# Patient Record
Sex: FEMALE | Race: OTHER | HISPANIC OR LATINO
[De-identification: names, ages, dates, MRNs, and addresses within clinical notes are randomized per-mention and may not be internally consistent; named-entity substitution may affect disease eponyms.]

---

## 2020-01-02 ENCOUNTER — ASOB RESULT (OUTPATIENT)
Age: 30
End: 2020-01-02

## 2020-01-02 ENCOUNTER — APPOINTMENT (OUTPATIENT)
Dept: ANTEPARTUM | Facility: CLINIC | Age: 30
End: 2020-01-02
Payer: MEDICAID

## 2020-01-02 PROBLEM — Z00.00 ENCOUNTER FOR PREVENTIVE HEALTH EXAMINATION: Status: ACTIVE | Noted: 2020-01-02

## 2020-01-02 PROCEDURE — 76805 OB US >/= 14 WKS SNGL FETUS: CPT

## 2020-01-02 PROCEDURE — 76817 TRANSVAGINAL US OBSTETRIC: CPT

## 2020-01-17 ENCOUNTER — ASOB RESULT (OUTPATIENT)
Age: 30
End: 2020-01-17

## 2020-01-17 ENCOUNTER — APPOINTMENT (OUTPATIENT)
Dept: ANTEPARTUM | Facility: CLINIC | Age: 30
End: 2020-01-17
Payer: MEDICAID

## 2020-01-17 PROCEDURE — 76816 OB US FOLLOW-UP PER FETUS: CPT

## 2022-06-02 ENCOUNTER — EMERGENCY (EMERGENCY)
Facility: HOSPITAL | Age: 32
LOS: 1 days | Discharge: HOME | End: 2022-06-02
Attending: EMERGENCY MEDICINE
Payer: MEDICAID

## 2022-06-02 VITALS
WEIGHT: 223.11 LBS | OXYGEN SATURATION: 98 % | RESPIRATION RATE: 16 BRPM | TEMPERATURE: 98 F | HEART RATE: 86 BPM | DIASTOLIC BLOOD PRESSURE: 82 MMHG | SYSTOLIC BLOOD PRESSURE: 128 MMHG

## 2022-06-02 LAB
ALBUMIN SERPL ELPH-MCNC: 4.1 G/DL — SIGNIFICANT CHANGE UP (ref 3.3–5.2)
ALP SERPL-CCNC: 96 U/L — SIGNIFICANT CHANGE UP (ref 40–120)
ALT FLD-CCNC: 14 U/L — SIGNIFICANT CHANGE UP
ANION GAP SERPL CALC-SCNC: 16 MMOL/L — SIGNIFICANT CHANGE UP (ref 5–17)
APPEARANCE UR: CLEAR — SIGNIFICANT CHANGE UP
AST SERPL-CCNC: 18 U/L — SIGNIFICANT CHANGE UP
BACTERIA # UR AUTO: ABNORMAL
BASOPHILS # BLD AUTO: 0.04 K/UL — SIGNIFICANT CHANGE UP (ref 0–0.2)
BASOPHILS NFR BLD AUTO: 0.4 % — SIGNIFICANT CHANGE UP (ref 0–2)
BILIRUB SERPL-MCNC: 0.3 MG/DL — LOW (ref 0.4–2)
BILIRUB UR-MCNC: NEGATIVE — SIGNIFICANT CHANGE UP
BLD GP AB SCN SERPL QL: SIGNIFICANT CHANGE UP
BUN SERPL-MCNC: 8.8 MG/DL — SIGNIFICANT CHANGE UP (ref 8–20)
CALCIUM SERPL-MCNC: 8.9 MG/DL — SIGNIFICANT CHANGE UP (ref 8.6–10.2)
CHLORIDE SERPL-SCNC: 103 MMOL/L — SIGNIFICANT CHANGE UP (ref 98–107)
CO2 SERPL-SCNC: 19 MMOL/L — LOW (ref 22–29)
COD CRY URNS QL: ABNORMAL
COLOR SPEC: YELLOW — SIGNIFICANT CHANGE UP
CREAT SERPL-MCNC: 0.34 MG/DL — LOW (ref 0.5–1.3)
DIFF PNL FLD: ABNORMAL
EGFR: 142 ML/MIN/1.73M2 — SIGNIFICANT CHANGE UP
EOSINOPHIL # BLD AUTO: 0.12 K/UL — SIGNIFICANT CHANGE UP (ref 0–0.5)
EOSINOPHIL NFR BLD AUTO: 1.2 % — SIGNIFICANT CHANGE UP (ref 0–6)
EPI CELLS # UR: ABNORMAL
GLUCOSE SERPL-MCNC: 87 MG/DL — SIGNIFICANT CHANGE UP (ref 70–99)
GLUCOSE UR QL: NEGATIVE MG/DL — SIGNIFICANT CHANGE UP
HCG SERPL-ACNC: HIGH MIU/ML
HCT VFR BLD CALC: 40.9 % — SIGNIFICANT CHANGE UP (ref 34.5–45)
HGB BLD-MCNC: 13.5 G/DL — SIGNIFICANT CHANGE UP (ref 11.5–15.5)
IMM GRANULOCYTES NFR BLD AUTO: 0.3 % — SIGNIFICANT CHANGE UP (ref 0–1.5)
KETONES UR-MCNC: ABNORMAL
LEUKOCYTE ESTERASE UR-ACNC: ABNORMAL
LYMPHOCYTES # BLD AUTO: 2.87 K/UL — SIGNIFICANT CHANGE UP (ref 1–3.3)
LYMPHOCYTES # BLD AUTO: 29.3 % — SIGNIFICANT CHANGE UP (ref 13–44)
MCHC RBC-ENTMCNC: 27 PG — SIGNIFICANT CHANGE UP (ref 27–34)
MCHC RBC-ENTMCNC: 33 GM/DL — SIGNIFICANT CHANGE UP (ref 32–36)
MCV RBC AUTO: 81.8 FL — SIGNIFICANT CHANGE UP (ref 80–100)
MONOCYTES # BLD AUTO: 0.47 K/UL — SIGNIFICANT CHANGE UP (ref 0–0.9)
MONOCYTES NFR BLD AUTO: 4.8 % — SIGNIFICANT CHANGE UP (ref 2–14)
NEUTROPHILS # BLD AUTO: 6.27 K/UL — SIGNIFICANT CHANGE UP (ref 1.8–7.4)
NEUTROPHILS NFR BLD AUTO: 64 % — SIGNIFICANT CHANGE UP (ref 43–77)
NITRITE UR-MCNC: NEGATIVE — SIGNIFICANT CHANGE UP
PH UR: 6 — SIGNIFICANT CHANGE UP (ref 5–8)
PLATELET # BLD AUTO: 277 K/UL — SIGNIFICANT CHANGE UP (ref 150–400)
POTASSIUM SERPL-MCNC: 4.1 MMOL/L — SIGNIFICANT CHANGE UP (ref 3.5–5.3)
POTASSIUM SERPL-SCNC: 4.1 MMOL/L — SIGNIFICANT CHANGE UP (ref 3.5–5.3)
PROT SERPL-MCNC: 7.6 G/DL — SIGNIFICANT CHANGE UP (ref 6.6–8.7)
PROT UR-MCNC: NEGATIVE — SIGNIFICANT CHANGE UP
RBC # BLD: 5 M/UL — SIGNIFICANT CHANGE UP (ref 3.8–5.2)
RBC # FLD: 13 % — SIGNIFICANT CHANGE UP (ref 10.3–14.5)
RBC CASTS # UR COMP ASSIST: ABNORMAL /HPF (ref 0–4)
SODIUM SERPL-SCNC: 138 MMOL/L — SIGNIFICANT CHANGE UP (ref 135–145)
SP GR SPEC: 1.02 — SIGNIFICANT CHANGE UP (ref 1.01–1.02)
UROBILINOGEN FLD QL: 1 MG/DL
WBC # BLD: 9.8 K/UL — SIGNIFICANT CHANGE UP (ref 3.8–10.5)
WBC # FLD AUTO: 9.8 K/UL — SIGNIFICANT CHANGE UP (ref 3.8–10.5)
WBC UR QL: ABNORMAL /HPF (ref 0–5)

## 2022-06-02 PROCEDURE — 99284 EMERGENCY DEPT VISIT MOD MDM: CPT

## 2022-06-02 PROCEDURE — 86850 RBC ANTIBODY SCREEN: CPT

## 2022-06-02 PROCEDURE — 76817 TRANSVAGINAL US OBSTETRIC: CPT | Mod: 26

## 2022-06-02 PROCEDURE — 84702 CHORIONIC GONADOTROPIN TEST: CPT

## 2022-06-02 PROCEDURE — 99284 EMERGENCY DEPT VISIT MOD MDM: CPT | Mod: 25

## 2022-06-02 PROCEDURE — 36415 COLL VENOUS BLD VENIPUNCTURE: CPT

## 2022-06-02 PROCEDURE — 76817 TRANSVAGINAL US OBSTETRIC: CPT

## 2022-06-02 PROCEDURE — 76801 OB US < 14 WKS SINGLE FETUS: CPT | Mod: 26

## 2022-06-02 PROCEDURE — 80053 COMPREHEN METABOLIC PANEL: CPT

## 2022-06-02 PROCEDURE — 85025 COMPLETE CBC W/AUTO DIFF WBC: CPT

## 2022-06-02 PROCEDURE — 81001 URINALYSIS AUTO W/SCOPE: CPT

## 2022-06-02 PROCEDURE — 76801 OB US < 14 WKS SINGLE FETUS: CPT

## 2022-06-02 PROCEDURE — 86901 BLOOD TYPING SEROLOGIC RH(D): CPT

## 2022-06-02 PROCEDURE — 86900 BLOOD TYPING SEROLOGIC ABO: CPT

## 2022-06-02 RX ORDER — CEPHALEXIN 500 MG
1 CAPSULE ORAL
Qty: 14 | Refills: 0
Start: 2022-06-02 | End: 2022-06-08

## 2022-06-02 RX ORDER — CEPHALEXIN 500 MG
500 CAPSULE ORAL ONCE
Refills: 0 | Status: COMPLETED | OUTPATIENT
Start: 2022-06-02 | End: 2022-06-02

## 2022-06-02 RX ADMIN — Medication 500 MILLIGRAM(S): at 22:09

## 2022-06-02 NOTE — ED STATDOCS - NSFOLLOWUPINSTRUCTIONS_ED_ALL_ED_FT
[place] los antibióticos según lo prescrito para la infección del tracto urinario.  [place] Tylenol según sea necesario para el dolor.  [place] un seguimiento con palmer ginecólogo en 2 días para exámenes de laboratorio y ultrasonido repetidos  Reg[place] a la lucio de emergencias por empeoramiento del dolor abdominal, empeoramiento del sangrado, fiebre u otra inquietud.    Amenaza de aborto espontáneo    Marleny amenaza de aborto es el término para el sangrado vaginal norberto las primeras 20 semanas de embarazo, nikolai el embarazo no ha terminado. Si tiene sangrado vaginal norberto yuliet tiempo, palmer proveedor de atención médica le hará pruebas para verificar si todavía está embarazada. Si las pruebas muestran que todavía está embarazada y el bebé en desarrollo (feto) dentro de palmer matriz (útero) todavía está creciendo, palmer condición se considera marleny amenaza de aborto espontáneo. Marleny amenaza de aborto espontáneo no significa que palmer embarazo terminará, nikolai aumenta el riesgo de perderlo. Es importante tener un seguimiento cercano con palmer obstetra.    BUSQUE ATENCIÓN MÉDICA DE INMEDIATO SI TIENE ALGUNO DE LOS SIGUIENTES SÍNTOMAS: sangrado vaginal intenso, calambres abdominales o lumbares intensos, fiebre/escalofríos o aturdimiento/mareo.

## 2022-06-02 NOTE — ED STATDOCS - OBJECTIVE STATEMENT
30 y/o female  @6 week pregnant with no PMHx, c/o pregnancy problem. Pt reports vaginal bleeding while using the restroom today and back pain. Pt had GYN appointment on , had ultrasound which showed normal gestational sac. GYN Dr. Alonso.  Deshawn

## 2022-06-02 NOTE — ED STATDOCS - PROGRESS NOTE DETAILS
Bibi Benítez PA :  PT evaluated by intake physician. HPI/PE/ROS as noted above. Will follow up plan per intake physician  HCG 83748 US 6 wks 2 days IUP. +UTI.  Will start on abx for UTI, advised pt this could be threatened miscarriage, recommended repeat labs/us in 2 days with her OBGYN  Pt agreeable with plan.

## 2022-06-02 NOTE — ED STATDOCS - PATIENT PORTAL LINK FT
You can access the FollowMyHealth Patient Portal offered by [facility] by registering at the following website: [email or web address]. By joining [facility]’s FollowMyHealth portal, you will also be able to view your health information using other applications (apps) compatible with our system.

## 2022-06-02 NOTE — ED STATDOCS - ATTENDING APP SHARED VISIT CONTRIBUTION OF CARE
I, Maurice Levi, performed the initial face to face bedside interview with this patient regarding history of present illness, review of symptoms and relevant past medical, social and family history.  I completed an independent physical examination.  I was the initial provider who evaluated this patient. I have signed out the follow up of any pending tests (i.e. labs, radiological studies) to the ACP.  I have communicated the patient’s plan of care and disposition with the ACP.

## 2022-06-03 ENCOUNTER — NON-APPOINTMENT (OUTPATIENT)
Age: 32
End: 2022-06-03

## 2022-06-04 ENCOUNTER — APPOINTMENT (OUTPATIENT)
Dept: OBGYN | Facility: CLINIC | Age: 32
End: 2022-06-04
Payer: MEDICAID

## 2022-06-04 VITALS
HEIGHT: 65 IN | BODY MASS INDEX: 36.82 KG/M2 | WEIGHT: 221 LBS | DIASTOLIC BLOOD PRESSURE: 82 MMHG | SYSTOLIC BLOOD PRESSURE: 128 MMHG

## 2022-06-04 DIAGNOSIS — Z78.9 OTHER SPECIFIED HEALTH STATUS: ICD-10-CM

## 2022-06-04 DIAGNOSIS — O20.0 THREATENED ABORTION: ICD-10-CM

## 2022-06-04 DIAGNOSIS — Z87.09 PERSONAL HISTORY OF OTHER DISEASES OF THE RESPIRATORY SYSTEM: ICD-10-CM

## 2022-06-04 LAB
HCG UR QL: POSITIVE
QUALITY CONTROL: YES

## 2022-06-04 PROCEDURE — 81025 URINE PREGNANCY TEST: CPT

## 2022-06-04 PROCEDURE — 99204 OFFICE O/P NEW MOD 45 MIN: CPT

## 2022-06-04 NOTE — HISTORY OF PRESENT ILLNESS
[N] : Patient does not use contraception [Y] : Positive pregnancy history [Menarche Age: ____] : age at menarche was [unfilled] [Currently Active] : currently active [LMPDate] : 4/8/22 [PGHxTotal] : 4 [[facility]FullTerm] : 2 [PGxPremature] : 1 [[facility] Maryvaleiving] : 3 [FreeTextEntry1] : 4/8/22

## 2022-06-04 NOTE — PLAN
[FreeTextEntry1] : We discussed her sonogram from the ER yesterday noting a fetal heart of 113 we discussed the differentials for low FH including that it may just be too early and I would advise a repeat sonogram in 1 week to evaluate for growth and FH.  We also reviewed that at times this can be a red flag for miscarriage and bleeding precautions and parameters reviewed with the patient.  \par \par Patient verbalized good understanding via  of all instructions\par \par fu 1 week or sooner as needed

## 2022-06-10 ENCOUNTER — ASOB RESULT (OUTPATIENT)
Age: 32
End: 2022-06-10

## 2022-06-10 ENCOUNTER — APPOINTMENT (OUTPATIENT)
Dept: ANTEPARTUM | Facility: CLINIC | Age: 32
End: 2022-06-10
Payer: MEDICAID

## 2022-06-10 ENCOUNTER — LABORATORY RESULT (OUTPATIENT)
Age: 32
End: 2022-06-10

## 2022-06-10 ENCOUNTER — APPOINTMENT (OUTPATIENT)
Dept: OBGYN | Facility: CLINIC | Age: 32
End: 2022-06-10
Payer: MEDICAID

## 2022-06-10 VITALS
SYSTOLIC BLOOD PRESSURE: 116 MMHG | DIASTOLIC BLOOD PRESSURE: 70 MMHG | WEIGHT: 222 LBS | HEIGHT: 65 IN | BODY MASS INDEX: 36.99 KG/M2

## 2022-06-10 DIAGNOSIS — Z87.51 PERSONAL HISTORY OF PRE-TERM LABOR: ICD-10-CM

## 2022-06-10 DIAGNOSIS — Z34.81 ENCOUNTER FOR SUPERVISION OF OTHER NORMAL PREGNANCY, FIRST TRIMESTER: ICD-10-CM

## 2022-06-10 LAB
BILIRUB UR QL STRIP: NORMAL
CLARITY UR: CLEAR
COLLECTION METHOD: NORMAL
GLUCOSE UR-MCNC: NORMAL
HCG UR QL: 0.2 EU/DL
HGB UR QL STRIP.AUTO: ABNORMAL
KETONES UR-MCNC: NORMAL
LEUKOCYTE ESTERASE UR QL STRIP: ABNORMAL
NITRITE UR QL STRIP: NORMAL
PH UR STRIP: 7.5
PROT UR STRIP-MCNC: NORMAL
SP GR UR STRIP: 1.02

## 2022-06-10 PROCEDURE — 76817 TRANSVAGINAL US OBSTETRIC: CPT

## 2022-06-10 PROCEDURE — 0500F INITIAL PRENATAL CARE VISIT: CPT

## 2022-06-10 PROCEDURE — 36415 COLL VENOUS BLD VENIPUNCTURE: CPT

## 2022-06-13 ENCOUNTER — NON-APPOINTMENT (OUTPATIENT)
Age: 32
End: 2022-06-13

## 2022-06-30 LAB
ABO + RH PNL BLD: NORMAL
APPEARANCE: CLEAR
AR GENE MUT ANL BLD/T: NORMAL
BASOPHILS # BLD AUTO: 0.03 K/UL
BASOPHILS NFR BLD AUTO: 0.4 %
BILIRUBIN URINE: NEGATIVE
BLD GP AB SCN SERPL QL: NORMAL
BLOOD URINE: ABNORMAL
C TRACH RRNA SPEC QL NAA+PROBE: NOT DETECTED
CFTR MUT TESTED BLD/T: NEGATIVE
CMV IGG SERPL QL: 5.4 U/ML
CMV IGG SERPL-IMP: POSITIVE
CMV IGM SERPL QL: <8 AU/ML
CMV IGM SERPL QL: NEGATIVE
COLOR: NORMAL
EOSINOPHIL # BLD AUTO: 0.13 K/UL
EOSINOPHIL NFR BLD AUTO: 1.5 %
ESTIMATED AVERAGE GLUCOSE: 103 MG/DL
FMR1 GENE MUT ANL BLD/T: NORMAL
GLUCOSE QUALITATIVE U: NEGATIVE
HBA1C MFR BLD HPLC: 5.2 %
HBV SURFACE AG SER QL: NONREACTIVE
HCT VFR BLD CALC: 40.7 %
HGB A MFR BLD: 97.5 %
HGB A2 MFR BLD: 2.5 %
HGB BLD-MCNC: 13.3 G/DL
HGB FRACT BLD-IMP: NORMAL
HIV1+2 AB SPEC QL IA.RAPID: NONREACTIVE
IMM GRANULOCYTES NFR BLD AUTO: 0.4 %
KETONES URINE: NEGATIVE
LEUKOCYTE ESTERASE URINE: ABNORMAL
LYMPHOCYTES # BLD AUTO: 2.61 K/UL
LYMPHOCYTES NFR BLD AUTO: 31 %
MAN DIFF?: NORMAL
MCHC RBC-ENTMCNC: 26.7 PG
MCHC RBC-ENTMCNC: 32.7 GM/DL
MCV RBC AUTO: 81.6 FL
MEV IGG FLD QL IA: 56.5 AU/ML
MEV IGG+IGM SER-IMP: POSITIVE
MONOCYTES # BLD AUTO: 0.37 K/UL
MONOCYTES NFR BLD AUTO: 4.4 %
N GONORRHOEA RRNA SPEC QL NAA+PROBE: NOT DETECTED
NEUTROPHILS # BLD AUTO: 5.26 K/UL
NEUTROPHILS NFR BLD AUTO: 62.3 %
NITRITE URINE: NEGATIVE
PH URINE: 7.5
PLATELET # BLD AUTO: 298 K/UL
PROTEIN URINE: NEGATIVE
RBC # BLD: 4.99 M/UL
RBC # FLD: 13.3 %
RUBV IGG FLD-ACNC: 4.8 INDEX
RUBV IGG SER-IMP: POSITIVE
RUBV IGM FLD-ACNC: <20 AU/ML
SOURCE AMPLIFICATION: NORMAL
SPECIFIC GRAVITY URINE: 1.01
T PALLIDUM AB SER QL IA: NEGATIVE
TSH SERPL-ACNC: 2.23 UIU/ML
UROBILINOGEN URINE: NORMAL
WBC # FLD AUTO: 8.43 K/UL

## 2022-07-14 ENCOUNTER — APPOINTMENT (OUTPATIENT)
Dept: OBGYN | Facility: CLINIC | Age: 32
End: 2022-07-14

## 2022-07-14 ENCOUNTER — APPOINTMENT (OUTPATIENT)
Dept: ANTEPARTUM | Facility: CLINIC | Age: 32
End: 2022-07-14

## 2022-07-14 ENCOUNTER — ASOB RESULT (OUTPATIENT)
Age: 32
End: 2022-07-14

## 2022-07-14 VITALS
DIASTOLIC BLOOD PRESSURE: 64 MMHG | BODY MASS INDEX: 36.99 KG/M2 | SYSTOLIC BLOOD PRESSURE: 110 MMHG | HEIGHT: 65 IN | WEIGHT: 222 LBS

## 2022-07-14 DIAGNOSIS — Z34.91 ENCOUNTER FOR SUPERVISION OF NORMAL PREGNANCY, UNSPECIFIED, FIRST TRIMESTER: ICD-10-CM

## 2022-07-14 PROCEDURE — 36415 COLL VENOUS BLD VENIPUNCTURE: CPT

## 2022-07-14 PROCEDURE — 76813 OB US NUCHAL MEAS 1 GEST: CPT

## 2022-07-14 PROCEDURE — 0502F SUBSEQUENT PRENATAL CARE: CPT

## 2022-07-19 LAB
1ST TRIMESTER DATA: NORMAL
ADDENDUM DOC: NORMAL
AFP PNL SERPL: NORMAL
AFP SERPL-ACNC: NORMAL
CLINICAL BIOCHEMIST REVIEW: NORMAL
FREE BETA HCG 1ST TRIMESTER: NORMAL
Lab: NORMAL
NASAL BONE: PRESENT
NOTES NTD: NORMAL
NT: NORMAL
PAPP-A SERPL-ACNC: NORMAL
TRISOMY 18/3: NORMAL

## 2022-08-23 ENCOUNTER — APPOINTMENT (OUTPATIENT)
Dept: OBGYN | Facility: CLINIC | Age: 32
End: 2022-08-23

## 2022-08-23 VITALS
HEIGHT: 65 IN | BODY MASS INDEX: 35.65 KG/M2 | SYSTOLIC BLOOD PRESSURE: 110 MMHG | WEIGHT: 214 LBS | DIASTOLIC BLOOD PRESSURE: 60 MMHG

## 2022-08-23 DIAGNOSIS — Z30.2 ENCOUNTER FOR STERILIZATION: ICD-10-CM

## 2022-08-23 LAB
BILIRUB UR QL STRIP: NORMAL
GLUCOSE UR-MCNC: NORMAL
HCG UR QL: 1 EU/DL
HGB UR QL STRIP.AUTO: NORMAL
KETONES UR-MCNC: NORMAL
LEUKOCYTE ESTERASE UR QL STRIP: ABNORMAL
NITRITE UR QL STRIP: NORMAL
PH UR STRIP: 7
PROT UR STRIP-MCNC: NORMAL
SP GR UR STRIP: >=1.03

## 2022-08-23 PROCEDURE — 36415 COLL VENOUS BLD VENIPUNCTURE: CPT

## 2022-08-23 PROCEDURE — 0502F SUBSEQUENT PRENATAL CARE: CPT

## 2022-08-26 ENCOUNTER — APPOINTMENT (OUTPATIENT)
Dept: OBGYN | Facility: CLINIC | Age: 32
End: 2022-08-26

## 2022-08-26 VITALS
DIASTOLIC BLOOD PRESSURE: 60 MMHG | BODY MASS INDEX: 35.65 KG/M2 | HEIGHT: 65 IN | WEIGHT: 214 LBS | SYSTOLIC BLOOD PRESSURE: 110 MMHG

## 2022-08-26 DIAGNOSIS — Z33.1 PREGNANT STATE, INCIDENTAL: ICD-10-CM

## 2022-08-26 DIAGNOSIS — B37.9 CANDIDIASIS, UNSPECIFIED: ICD-10-CM

## 2022-08-26 PROCEDURE — 99213 OFFICE O/P EST LOW 20 MIN: CPT

## 2022-08-26 NOTE — DISCUSSION/SUMMARY
[FreeTextEntry1] : 32 y/o 18.4w pregnant with likely vaginal candidiasis based on symptoms and exam.\par - Miconazole 200 mg vaginal suppository prescribed to be taken for 3 days\par - Affirm test collected to confirm result/rule out BV\par - Continue already scheduled prenatal visits\par \par Chris Terrazas MD

## 2022-08-26 NOTE — HISTORY OF PRESENT ILLNESS
[FreeTextEntry1] : 32 y/o at 18.4w pregnant presenting for acute visit for vaginal itching. Reports past two days she has had itching and thick white discharge. No other complaints.

## 2022-08-26 NOTE — PHYSICAL EXAM
[Chaperone Present] : A chaperone was present in the examining room during all aspects of the physical examination [Labia Majora] : normal [Labia Minora] : normal [Discharge] : a  ~M vaginal discharge was present [Moderate] : moderate [White] : white [Thick] : thick [Normal] : normal

## 2022-08-29 ENCOUNTER — NON-APPOINTMENT (OUTPATIENT)
Age: 32
End: 2022-08-29

## 2022-08-29 LAB
CANDIDA VAG CYTO: NOT DETECTED
G VAGINALIS+PREV SP MTYP VAG QL MICRO: NOT DETECTED
T VAGINALIS VAG QL WET PREP: NOT DETECTED

## 2022-09-06 LAB — AFP PNL SERPL: NORMAL

## 2022-09-13 ENCOUNTER — ASOB RESULT (OUTPATIENT)
Age: 32
End: 2022-09-13

## 2022-09-13 ENCOUNTER — APPOINTMENT (OUTPATIENT)
Dept: ANTEPARTUM | Facility: CLINIC | Age: 32
End: 2022-09-13

## 2022-09-13 PROCEDURE — 76817 TRANSVAGINAL US OBSTETRIC: CPT | Mod: 59

## 2022-09-13 PROCEDURE — 76811 OB US DETAILED SNGL FETUS: CPT

## 2022-09-21 ENCOUNTER — NON-APPOINTMENT (OUTPATIENT)
Age: 32
End: 2022-09-21

## 2022-09-21 ENCOUNTER — APPOINTMENT (OUTPATIENT)
Dept: OBGYN | Facility: CLINIC | Age: 32
End: 2022-09-21

## 2022-09-21 VITALS
SYSTOLIC BLOOD PRESSURE: 112 MMHG | HEIGHT: 65 IN | WEIGHT: 216 LBS | DIASTOLIC BLOOD PRESSURE: 70 MMHG | BODY MASS INDEX: 35.99 KG/M2

## 2022-09-21 PROCEDURE — 0502F SUBSEQUENT PRENATAL CARE: CPT

## 2022-09-27 LAB
BILIRUB UR QL STRIP: NEGATIVE
GLUCOSE UR-MCNC: NEGATIVE
HCG UR QL: 0.2 EU/DL
HGB UR QL STRIP.AUTO: NEGATIVE
KETONES UR-MCNC: NEGATIVE
LEUKOCYTE ESTERASE UR QL STRIP: NEGATIVE
NITRITE UR QL STRIP: NEGATIVE
PH UR STRIP: 7
PROT UR STRIP-MCNC: NEGATIVE
SP GR UR STRIP: 1.01

## 2022-10-20 ENCOUNTER — NON-APPOINTMENT (OUTPATIENT)
Age: 32
End: 2022-10-20

## 2022-10-20 ENCOUNTER — APPOINTMENT (OUTPATIENT)
Dept: OBGYN | Facility: CLINIC | Age: 32
End: 2022-10-20

## 2022-10-20 VITALS
DIASTOLIC BLOOD PRESSURE: 66 MMHG | HEIGHT: 65 IN | WEIGHT: 218 LBS | SYSTOLIC BLOOD PRESSURE: 130 MMHG | BODY MASS INDEX: 36.32 KG/M2

## 2022-10-20 DIAGNOSIS — Z34.92 ENCOUNTER FOR SUPERVISION OF NORMAL PREGNANCY, UNSPECIFIED, SECOND TRIMESTER: ICD-10-CM

## 2022-10-20 DIAGNOSIS — Z11.6 ENCOUNTER FOR SCREENING FOR OTHER PROTOZOAL DISEASES AND HELMINTHIASES: ICD-10-CM

## 2022-10-20 LAB
BILIRUB UR QL STRIP: NORMAL
GLUCOSE UR-MCNC: NORMAL
HCG UR QL: 0.2 EU/DL
HGB UR QL STRIP.AUTO: NORMAL
KETONES UR-MCNC: NORMAL
LEUKOCYTE ESTERASE UR QL STRIP: ABNORMAL
NITRITE UR QL STRIP: NORMAL
PH UR STRIP: 7
PROT UR STRIP-MCNC: NORMAL
SP GR UR STRIP: 1.02

## 2022-10-20 PROCEDURE — 0502F SUBSEQUENT PRENATAL CARE: CPT

## 2022-10-20 PROCEDURE — 36415 COLL VENOUS BLD VENIPUNCTURE: CPT

## 2022-10-21 ENCOUNTER — NON-APPOINTMENT (OUTPATIENT)
Age: 32
End: 2022-10-21

## 2022-10-21 DIAGNOSIS — R73.09 OTHER ABNORMAL GLUCOSE: ICD-10-CM

## 2022-10-21 DIAGNOSIS — O99.810 ABNORMAL GLUCOSE COMPLICATING PREGNANCY: ICD-10-CM

## 2022-10-21 LAB
BASOPHILS # BLD AUTO: 0.02 K/UL
BASOPHILS NFR BLD AUTO: 0.2 %
EOSINOPHIL # BLD AUTO: 0.08 K/UL
EOSINOPHIL NFR BLD AUTO: 0.9 %
GLUCOSE 1H P 50 G GLC PO SERPL-MCNC: 153 MG/DL
HCT VFR BLD CALC: 37.8 %
HGB BLD-MCNC: 12.3 G/DL
IMM GRANULOCYTES NFR BLD AUTO: 0.2 %
LYMPHOCYTES # BLD AUTO: 1.75 K/UL
LYMPHOCYTES NFR BLD AUTO: 19.2 %
MAN DIFF?: NORMAL
MCHC RBC-ENTMCNC: 28.3 PG
MCHC RBC-ENTMCNC: 32.5 GM/DL
MCV RBC AUTO: 86.9 FL
MONOCYTES # BLD AUTO: 0.3 K/UL
MONOCYTES NFR BLD AUTO: 3.3 %
NEUTROPHILS # BLD AUTO: 6.95 K/UL
NEUTROPHILS NFR BLD AUTO: 76.2 %
PLATELET # BLD AUTO: 229 K/UL
RBC # BLD: 4.35 M/UL
RBC # FLD: 13.3 %
WBC # FLD AUTO: 9.12 K/UL

## 2022-10-27 LAB — T CRUZI AB SER-ACNC: 0.2 IV

## 2022-11-02 ENCOUNTER — NON-APPOINTMENT (OUTPATIENT)
Age: 32
End: 2022-11-02

## 2022-11-02 NOTE — ED STATDOCS - MUSCULOSKELETAL, MLM
-Please see plan under problem \"HTN\"   range of motion is not limited and there is no muscle tenderness.

## 2022-11-03 ENCOUNTER — NON-APPOINTMENT (OUTPATIENT)
Age: 32
End: 2022-11-03

## 2022-11-10 ENCOUNTER — NON-APPOINTMENT (OUTPATIENT)
Age: 32
End: 2022-11-10

## 2022-11-10 ENCOUNTER — APPOINTMENT (OUTPATIENT)
Dept: OBGYN | Facility: CLINIC | Age: 32
End: 2022-11-10

## 2022-11-10 VITALS — SYSTOLIC BLOOD PRESSURE: 137 MMHG | DIASTOLIC BLOOD PRESSURE: 75 MMHG | BODY MASS INDEX: 36.94 KG/M2 | WEIGHT: 222 LBS

## 2022-11-10 PROCEDURE — 0502F SUBSEQUENT PRENATAL CARE: CPT

## 2022-11-10 RX ORDER — ALBUTEROL SULFATE 90 UG/1
108 (90 BASE) INHALANT RESPIRATORY (INHALATION)
Qty: 8 | Refills: 0 | Status: ACTIVE | COMMUNITY
Start: 2022-07-21

## 2022-11-10 RX ORDER — AZITHROMYCIN 250 MG/1
250 TABLET, FILM COATED ORAL
Qty: 6 | Refills: 0 | Status: DISCONTINUED | COMMUNITY
Start: 2022-07-21

## 2022-11-10 RX ORDER — CEPHALEXIN 500 MG/1
500 CAPSULE ORAL
Qty: 14 | Refills: 0 | Status: DISCONTINUED | COMMUNITY
Start: 2022-06-03

## 2022-11-12 LAB
BILIRUB UR QL STRIP: NORMAL
GLUCOSE UR-MCNC: NORMAL
HCG UR QL: 0.2 EU/DL
HGB UR QL STRIP.AUTO: NORMAL
KETONES UR-MCNC: NORMAL
LEUKOCYTE ESTERASE UR QL STRIP: ABNORMAL
NITRITE UR QL STRIP: NORMAL
PH UR STRIP: 6.5
PROT UR STRIP-MCNC: NORMAL
SP GR UR STRIP: 1.03

## 2022-11-15 ENCOUNTER — APPOINTMENT (OUTPATIENT)
Dept: ANTEPARTUM | Facility: CLINIC | Age: 32
End: 2022-11-15

## 2022-11-15 ENCOUNTER — ASOB RESULT (OUTPATIENT)
Age: 32
End: 2022-11-15

## 2022-11-15 PROCEDURE — 76816 OB US FOLLOW-UP PER FETUS: CPT

## 2022-12-01 ENCOUNTER — APPOINTMENT (OUTPATIENT)
Dept: OBGYN | Facility: CLINIC | Age: 32
End: 2022-12-01

## 2022-12-01 DIAGNOSIS — Z23 ENCOUNTER FOR IMMUNIZATION: ICD-10-CM

## 2022-12-01 LAB
BILIRUB UR QL STRIP: NORMAL
GLUCOSE UR-MCNC: NORMAL
HCG UR QL: 2 EU/DL
HGB UR QL STRIP.AUTO: NORMAL
KETONES UR-MCNC: NORMAL
LEUKOCYTE ESTERASE UR QL STRIP: ABNORMAL
NITRITE UR QL STRIP: NORMAL
PH UR STRIP: 7
PROT UR STRIP-MCNC: NORMAL
SP GR UR STRIP: 1.02

## 2022-12-01 PROCEDURE — 0502F SUBSEQUENT PRENATAL CARE: CPT

## 2022-12-01 PROCEDURE — 90715 TDAP VACCINE 7 YRS/> IM: CPT

## 2022-12-01 PROCEDURE — 90471 IMMUNIZATION ADMIN: CPT

## 2022-12-08 ENCOUNTER — MED ADMIN CHARGE (OUTPATIENT)
Age: 32
End: 2022-12-08

## 2022-12-08 PROBLEM — Z23 ENCOUNTER FOR IMMUNIZATION: Status: RESOLVED | Noted: 2022-12-08 | Resolved: 2022-12-22

## 2022-12-13 ENCOUNTER — APPOINTMENT (OUTPATIENT)
Dept: ANTEPARTUM | Facility: CLINIC | Age: 32
End: 2022-12-13

## 2022-12-13 ENCOUNTER — ASOB RESULT (OUTPATIENT)
Age: 32
End: 2022-12-13

## 2022-12-13 PROCEDURE — 76816 OB US FOLLOW-UP PER FETUS: CPT

## 2022-12-15 ENCOUNTER — APPOINTMENT (OUTPATIENT)
Dept: OBGYN | Facility: CLINIC | Age: 32
End: 2022-12-15

## 2022-12-15 VITALS — SYSTOLIC BLOOD PRESSURE: 110 MMHG | WEIGHT: 226 LBS | BODY MASS INDEX: 37.61 KG/M2 | DIASTOLIC BLOOD PRESSURE: 78 MMHG

## 2022-12-15 PROCEDURE — 0502F SUBSEQUENT PRENATAL CARE: CPT

## 2022-12-17 LAB
BILIRUB UR QL STRIP: NORMAL
GLUCOSE UR-MCNC: NORMAL
HCG UR QL: 1 EU/DL
HGB UR QL STRIP.AUTO: NORMAL
KETONES UR-MCNC: NORMAL
LEUKOCYTE ESTERASE UR QL STRIP: NORMAL
NITRITE UR QL STRIP: NORMAL
PH UR STRIP: 7
PROT UR STRIP-MCNC: ABNORMAL
SP GR UR STRIP: 1.02

## 2022-12-27 ENCOUNTER — NON-APPOINTMENT (OUTPATIENT)
Age: 32
End: 2022-12-27

## 2022-12-27 ENCOUNTER — APPOINTMENT (OUTPATIENT)
Dept: OBGYN | Facility: CLINIC | Age: 32
End: 2022-12-27
Payer: MEDICAID

## 2022-12-27 LAB
BILIRUB UR QL STRIP: NORMAL
GLUCOSE UR-MCNC: NORMAL
HCG UR QL: 0.2 EU/DL
HGB UR QL STRIP.AUTO: NORMAL
KETONES UR-MCNC: NORMAL
LEUKOCYTE ESTERASE UR QL STRIP: ABNORMAL
NITRITE UR QL STRIP: NORMAL
PH UR STRIP: 7
PROT UR STRIP-MCNC: NORMAL
SP GR UR STRIP: 1.01

## 2022-12-27 PROCEDURE — 36415 COLL VENOUS BLD VENIPUNCTURE: CPT

## 2022-12-27 PROCEDURE — 0502F SUBSEQUENT PRENATAL CARE: CPT

## 2022-12-29 LAB
HIV1+2 AB SPEC QL IA.RAPID: NONREACTIVE
T PALLIDUM AB SER QL IA: NEGATIVE

## 2022-12-30 LAB — B-HEM STREP SPEC QL CULT: NORMAL

## 2023-01-03 ENCOUNTER — NON-APPOINTMENT (OUTPATIENT)
Age: 33
End: 2023-01-03

## 2023-01-03 ENCOUNTER — APPOINTMENT (OUTPATIENT)
Dept: OBGYN | Facility: CLINIC | Age: 33
End: 2023-01-03
Payer: MEDICAID

## 2023-01-03 LAB
BILIRUB UR QL STRIP: NORMAL
GLUCOSE UR-MCNC: NORMAL
HCG UR QL: 1 EU/DL
HGB UR QL STRIP.AUTO: NORMAL
KETONES UR-MCNC: NORMAL
LEUKOCYTE ESTERASE UR QL STRIP: ABNORMAL
NITRITE UR QL STRIP: NORMAL
PH UR STRIP: 6.5
PROT UR STRIP-MCNC: NORMAL
SP GR UR STRIP: 1.02

## 2023-01-03 PROCEDURE — 0502F SUBSEQUENT PRENATAL CARE: CPT

## 2023-01-07 ENCOUNTER — OUTPATIENT (OUTPATIENT)
Dept: INPATIENT UNIT | Facility: HOSPITAL | Age: 33
LOS: 1 days | End: 2023-01-07
Payer: MEDICAID

## 2023-01-07 VITALS
HEART RATE: 96 BPM | TEMPERATURE: 99 F | SYSTOLIC BLOOD PRESSURE: 120 MMHG | DIASTOLIC BLOOD PRESSURE: 78 MMHG | RESPIRATION RATE: 18 BRPM

## 2023-01-07 VITALS
HEART RATE: 96 BPM | DIASTOLIC BLOOD PRESSURE: 78 MMHG | RESPIRATION RATE: 18 BRPM | SYSTOLIC BLOOD PRESSURE: 120 MMHG | TEMPERATURE: 99 F

## 2023-01-07 DIAGNOSIS — O47.1 FALSE LABOR AT OR AFTER 37 COMPLETED WEEKS OF GESTATION: ICD-10-CM

## 2023-01-07 PROCEDURE — G0463: CPT

## 2023-01-07 PROCEDURE — 59025 FETAL NON-STRESS TEST: CPT

## 2023-01-07 NOTE — OB RN TRIAGE NOTE - FALL HARM RISK - ATTEMPT OOB
Patient taken to [facility]  per w/c. Alert and oriented, denies pain. Right radial strong and palpable distal to puncture site. Pressure dressing in place no redness/bleeing/swelling noted. Report given to Tristin Colbert 70 and Abi Villafana RN. No

## 2023-01-07 NOTE — OB PROVIDER TRIAGE NOTE - NSOBPROVIDERNOTE_OBGYN_ALL_OB_FT
A/P: ANA LUISA HARTMANN is a 32y  at 37w5 GA assessed for r/o labor  - Cervical exam remain unchanged from office on 1/3  - PO hydration encouraged  Fetus: Reactive   [place]: q 10min   Dispo: Home with precautions. Patient was advised to return to the hospital for decreased fetal movement, vaginal bleeding, leakage of fluid, and/or contractions with increased intensity or frequency.     Discussed with Dr. Su

## 2023-01-07 NOTE — OB PROVIDER TRIAGE NOTE - NSHPPHYSICALEXAM_GEN_ALL_CORE
T(C): 37 (01-07-23 @ 15:33), Max: 37.0 (01-07-23 @ 15:09)  HR: 96 (01-07-23 @ 15:33) (96 - 96)  BP: 120/78 (01-07-23 @ 15:33) (120/78 - 120/78)  RR: 18 (01-07-23 @ 15:33) (18 - 18)    Gen: NAD, well-appearing, resting comfortably on room air   Abd: soft, gravid  Ext: non-edematous, non-tender   SVE: 2.5/50/-2  FHT: baseline 130, moderate variability, +accels, -decels   [place]: q10 min

## 2023-01-07 NOTE — OB PROVIDER TRIAGE NOTE - HISTORY OF PRESENT ILLNESS
ANA LUISA HARTMANN is a 32y  at 37w5 GA who presents to L&D for contractions every 10-20min for the past 3 days. Today she reports that she lost her mucus plug. She also reports limited water intake at home. Pt denies vaginal bleeding and leakage of fluid. She endorses good fetal movement. She was check in the office on 1/3/2023 and was found to be 2.5/50/-2. She denies shortness of breath, chest pain, and palpitations. No other complaints at this time.    Pregnancy course is  uncomplicated.     POB:  x3 , , and  (PPROM @ 35w)  PGYN: -fibroids/-cysts, denied STD hx, denies abnormal PAPs  PMH: Asthma   PSH: denies   SH: Denies tobacco use, EtOH use and illicit drug use during the pregnancy; Feels safe at home  Meds: PNV, albuterol   All: NKDA

## 2023-01-07 NOTE — OB PROVIDER TRIAGE NOTE - ATTENDING COMMENTS
Pt presented to me and tracing reviewed with resident. No cervical change since being in office. VSS and tracing reactive and appropriate for gestational age. All questions solicited and answered. Pt has follow up at [facility] on 1/10. Return precautions reviewed.     ANT Su MD

## 2023-01-09 ENCOUNTER — APPOINTMENT (OUTPATIENT)
Dept: ANTEPARTUM | Facility: CLINIC | Age: 33
End: 2023-01-09
Payer: MEDICAID

## 2023-01-09 ENCOUNTER — ASOB RESULT (OUTPATIENT)
Age: 33
End: 2023-01-09

## 2023-01-09 PROCEDURE — 76819 FETAL BIOPHYS PROFIL W/O NST: CPT

## 2023-01-09 PROCEDURE — 76816 OB US FOLLOW-UP PER FETUS: CPT

## 2023-01-10 ENCOUNTER — APPOINTMENT (OUTPATIENT)
Dept: OBGYN | Facility: CLINIC | Age: 33
End: 2023-01-10
Payer: MEDICAID

## 2023-01-10 VITALS
HEIGHT: 65 IN | BODY MASS INDEX: 39.32 KG/M2 | SYSTOLIC BLOOD PRESSURE: 110 MMHG | DIASTOLIC BLOOD PRESSURE: 70 MMHG | WEIGHT: 236 LBS

## 2023-01-10 DIAGNOSIS — Z34.93 ENCOUNTER FOR SUPERVISION OF NORMAL PREGNANCY, UNSPECIFIED, THIRD TRIMESTER: ICD-10-CM

## 2023-01-10 LAB
BILIRUB UR QL STRIP: NORMAL
GLUCOSE UR-MCNC: NORMAL
HCG UR QL: 1 EU/DL
HGB UR QL STRIP.AUTO: NORMAL
KETONES UR-MCNC: NORMAL
LEUKOCYTE ESTERASE UR QL STRIP: ABNORMAL
NITRITE UR QL STRIP: NORMAL
PH UR STRIP: 7
PROT UR STRIP-MCNC: NORMAL
SP GR UR STRIP: 1.02

## 2023-01-10 PROCEDURE — 0502F SUBSEQUENT PRENATAL CARE: CPT

## 2023-01-13 ENCOUNTER — TRANSCRIPTION ENCOUNTER (OUTPATIENT)
Age: 33
End: 2023-01-13

## 2023-01-13 ENCOUNTER — INPATIENT (INPATIENT)
Facility: HOSPITAL | Age: 33
LOS: 2 days | Discharge: HOME | End: 2023-01-16
Attending: OBSTETRICS & GYNECOLOGY | Admitting: OBSTETRICS & GYNECOLOGY
Payer: MEDICAID

## 2023-01-13 VITALS — DIASTOLIC BLOOD PRESSURE: 75 MMHG | HEART RATE: 91 BPM | SYSTOLIC BLOOD PRESSURE: 137 MMHG

## 2023-01-13 DIAGNOSIS — O47.1 FALSE LABOR AT OR AFTER 37 COMPLETED WEEKS OF GESTATION: ICD-10-CM

## 2023-01-13 NOTE — OB RN TRIAGE NOTE - NSICDXPASTMEDICALHX_GEN_ALL_CORE_FT
PAST MEDICAL HISTORY:  Asthma      (normal spontaneous vaginal delivery)      PAST MEDICAL HISTORY:  Asthma      (normal spontaneous vaginal delivery)      delivery

## 2023-01-14 ENCOUNTER — RESULT REVIEW (OUTPATIENT)
Age: 33
End: 2023-01-14

## 2023-01-14 ENCOUNTER — TRANSCRIPTION ENCOUNTER (OUTPATIENT)
Age: 33
End: 2023-01-14

## 2023-01-14 ENCOUNTER — NON-APPOINTMENT (OUTPATIENT)
Age: 33
End: 2023-01-14

## 2023-01-14 DIAGNOSIS — O26.893 OTHER SPECIFIED PREGNANCY RELATED CONDITIONS, THIRD TRIMESTER: ICD-10-CM

## 2023-01-14 LAB
BASOPHILS # BLD AUTO: 0.03 K/UL — SIGNIFICANT CHANGE UP (ref 0–0.2)
BASOPHILS NFR BLD AUTO: 0.3 % — SIGNIFICANT CHANGE UP (ref 0–2)
BLD GP AB SCN SERPL QL: SIGNIFICANT CHANGE UP
COVID-19 SPIKE DOMAIN AB INTERP: POSITIVE
COVID-19 SPIKE DOMAIN ANTIBODY RESULT: >250 U/ML — HIGH
EOSINOPHIL # BLD AUTO: 0.15 K/UL — SIGNIFICANT CHANGE UP (ref 0–0.5)
EOSINOPHIL NFR BLD AUTO: 1.6 % — SIGNIFICANT CHANGE UP (ref 0–6)
HCT VFR BLD CALC: 35.6 % — SIGNIFICANT CHANGE UP (ref 34.5–45)
HGB BLD-MCNC: 11.9 G/DL — SIGNIFICANT CHANGE UP (ref 11.5–15.5)
IMM GRANULOCYTES NFR BLD AUTO: 0.3 % — SIGNIFICANT CHANGE UP (ref 0–0.9)
LYMPHOCYTES # BLD AUTO: 2.57 K/UL — SIGNIFICANT CHANGE UP (ref 1–3.3)
LYMPHOCYTES # BLD AUTO: 27.1 % — SIGNIFICANT CHANGE UP (ref 13–44)
MCHC RBC-ENTMCNC: 27 PG — SIGNIFICANT CHANGE UP (ref 27–34)
MCHC RBC-ENTMCNC: 33.4 GM/DL — SIGNIFICANT CHANGE UP (ref 32–36)
MCV RBC AUTO: 80.9 FL — SIGNIFICANT CHANGE UP (ref 80–100)
MONOCYTES # BLD AUTO: 0.57 K/UL — SIGNIFICANT CHANGE UP (ref 0–0.9)
MONOCYTES NFR BLD AUTO: 6 % — SIGNIFICANT CHANGE UP (ref 2–14)
NEUTROPHILS # BLD AUTO: 6.14 K/UL — SIGNIFICANT CHANGE UP (ref 1.8–7.4)
NEUTROPHILS NFR BLD AUTO: 64.7 % — SIGNIFICANT CHANGE UP (ref 43–77)
PLATELET # BLD AUTO: 251 K/UL — SIGNIFICANT CHANGE UP (ref 150–400)
RBC # BLD: 4.4 M/UL — SIGNIFICANT CHANGE UP (ref 3.8–5.2)
RBC # FLD: 13 % — SIGNIFICANT CHANGE UP (ref 10.3–14.5)
SARS-COV-2 IGG+IGM SERPL QL IA: >250 U/ML — HIGH
SARS-COV-2 IGG+IGM SERPL QL IA: POSITIVE
SARS-COV-2 RNA SPEC QL NAA+PROBE: SIGNIFICANT CHANGE UP
T PALLIDUM AB TITR SER: NEGATIVE — SIGNIFICANT CHANGE UP
WBC # BLD: 9.49 K/UL — SIGNIFICANT CHANGE UP (ref 3.8–10.5)
WBC # FLD AUTO: 9.49 K/UL — SIGNIFICANT CHANGE UP (ref 3.8–10.5)

## 2023-01-14 PROCEDURE — 59510 CESAREAN DELIVERY: CPT | Mod: U7

## 2023-01-14 PROCEDURE — 88302 TISSUE EXAM BY PATHOLOGIST: CPT | Mod: 26

## 2023-01-14 PROCEDURE — 58611 LIGATE OVIDUCT(S) ADD-ON: CPT | Mod: 59

## 2023-01-14 RX ORDER — ACETAMINOPHEN 500 MG
1000 TABLET ORAL ONCE
Refills: 0 | Status: COMPLETED | OUTPATIENT
Start: 2023-01-14 | End: 2023-01-14

## 2023-01-14 RX ORDER — TETANUS TOXOID, REDUCED DIPHTHERIA TOXOID AND ACELLULAR PERTUSSIS VACCINE, ADSORBED 5; 2.5; 8; 8; 2.5 [IU]/.5ML; [IU]/.5ML; UG/.5ML; UG/.5ML; UG/.5ML
0.5 SUSPENSION INTRAMUSCULAR ONCE
Refills: 0 | Status: DISCONTINUED | OUTPATIENT
Start: 2023-01-14 | End: 2023-01-16

## 2023-01-14 RX ORDER — MAGNESIUM HYDROXIDE 400 MG/1
30 TABLET, CHEWABLE ORAL
Refills: 0 | Status: DISCONTINUED | OUTPATIENT
Start: 2023-01-14 | End: 2023-01-16

## 2023-01-14 RX ORDER — ENOXAPARIN SODIUM 100 MG/ML
60 INJECTION SUBCUTANEOUS EVERY 24 HOURS
Refills: 0 | Status: DISCONTINUED | OUTPATIENT
Start: 2023-01-15 | End: 2023-01-16

## 2023-01-14 RX ORDER — CEFAZOLIN SODIUM 1 G
2000 VIAL (EA) INJECTION ONCE
Refills: 0 | Status: COMPLETED | OUTPATIENT
Start: 2023-01-14 | End: 2023-01-14

## 2023-01-14 RX ORDER — ACETAMINOPHEN 500 MG
975 TABLET ORAL
Refills: 0 | Status: DISCONTINUED | OUTPATIENT
Start: 2023-01-14 | End: 2023-01-16

## 2023-01-14 RX ORDER — TRANEXAMIC ACID 100 MG/ML
1000 INJECTION, SOLUTION INTRAVENOUS ONCE
Refills: 0 | Status: COMPLETED | OUTPATIENT
Start: 2023-01-14 | End: 2023-01-14

## 2023-01-14 RX ORDER — SODIUM CHLORIDE 9 MG/ML
1000 INJECTION, SOLUTION INTRAVENOUS
Refills: 0 | Status: DISCONTINUED | OUTPATIENT
Start: 2023-01-14 | End: 2023-01-16

## 2023-01-14 RX ORDER — AZITHROMYCIN 500 MG/1
500 TABLET, FILM COATED ORAL ONCE
Refills: 0 | Status: COMPLETED | OUTPATIENT
Start: 2023-01-14 | End: 2023-01-14

## 2023-01-14 RX ORDER — OXYCODONE HYDROCHLORIDE 5 MG/1
5 TABLET ORAL
Refills: 0 | Status: DISCONTINUED | OUTPATIENT
Start: 2023-01-14 | End: 2023-01-16

## 2023-01-14 RX ORDER — SODIUM CHLORIDE 9 MG/ML
1000 INJECTION, SOLUTION INTRAVENOUS
Refills: 0 | Status: DISCONTINUED | OUTPATIENT
Start: 2023-01-14 | End: 2023-01-14

## 2023-01-14 RX ORDER — OXYTOCIN 10 UNIT/ML
2 VIAL (ML) INJECTION
Qty: 30 | Refills: 0 | Status: DISCONTINUED | OUTPATIENT
Start: 2023-01-14 | End: 2023-01-16

## 2023-01-14 RX ORDER — OXYTOCIN 10 UNIT/ML
333.33 VIAL (ML) INJECTION
Qty: 20 | Refills: 0 | Status: COMPLETED | OUTPATIENT
Start: 2023-01-14 | End: 2023-01-14

## 2023-01-14 RX ORDER — IBUPROFEN 200 MG
600 TABLET ORAL EVERY 6 HOURS
Refills: 0 | Status: COMPLETED | OUTPATIENT
Start: 2023-01-14 | End: 2023-12-13

## 2023-01-14 RX ORDER — LANOLIN
1 OINTMENT (GRAM) TOPICAL EVERY 6 HOURS
Refills: 0 | Status: DISCONTINUED | OUTPATIENT
Start: 2023-01-14 | End: 2023-01-16

## 2023-01-14 RX ORDER — DIPHENHYDRAMINE HCL 50 MG
25 CAPSULE ORAL EVERY 6 HOURS
Refills: 0 | Status: DISCONTINUED | OUTPATIENT
Start: 2023-01-14 | End: 2023-01-16

## 2023-01-14 RX ORDER — OXYTOCIN 10 UNIT/ML
333.33 VIAL (ML) INJECTION
Qty: 20 | Refills: 0 | Status: DISCONTINUED | OUTPATIENT
Start: 2023-01-14 | End: 2023-01-14

## 2023-01-14 RX ORDER — KETOROLAC TROMETHAMINE 30 MG/ML
30 SYRINGE (ML) INJECTION EVERY 6 HOURS
Refills: 0 | Status: DISCONTINUED | OUTPATIENT
Start: 2023-01-14 | End: 2023-01-15

## 2023-01-14 RX ORDER — CHLORHEXIDINE GLUCONATE 213 G/1000ML
1 SOLUTION TOPICAL ONCE
Refills: 0 | Status: COMPLETED | OUTPATIENT
Start: 2023-01-14 | End: 2023-01-14

## 2023-01-14 RX ORDER — CITRIC ACID/SODIUM CITRATE 300-500 MG
30 SOLUTION, ORAL ORAL ONCE
Refills: 0 | Status: COMPLETED | OUTPATIENT
Start: 2023-01-14 | End: 2023-01-14

## 2023-01-14 RX ORDER — SIMETHICONE 80 MG/1
80 TABLET, CHEWABLE ORAL EVERY 4 HOURS
Refills: 0 | Status: DISCONTINUED | OUTPATIENT
Start: 2023-01-14 | End: 2023-01-16

## 2023-01-14 RX ORDER — OXYCODONE HYDROCHLORIDE 5 MG/1
5 TABLET ORAL ONCE
Refills: 0 | Status: DISCONTINUED | OUTPATIENT
Start: 2023-01-14 | End: 2023-01-16

## 2023-01-14 RX ADMIN — SODIUM CHLORIDE 125 MILLILITER(S): 9 INJECTION, SOLUTION INTRAVENOUS at 05:00

## 2023-01-14 RX ADMIN — Medication 30 MILLIGRAM(S): at 16:30

## 2023-01-14 RX ADMIN — CHLORHEXIDINE GLUCONATE 1 APPLICATION(S): 213 SOLUTION TOPICAL at 15:30

## 2023-01-14 RX ADMIN — SODIUM CHLORIDE 125 MILLILITER(S): 9 INJECTION, SOLUTION INTRAVENOUS at 01:08

## 2023-01-14 RX ADMIN — Medication 100 MILLIGRAM(S): at 16:20

## 2023-01-14 RX ADMIN — AZITHROMYCIN 255 MILLIGRAM(S): 500 TABLET, FILM COATED ORAL at 16:10

## 2023-01-14 RX ADMIN — TRANEXAMIC ACID 220 MILLIGRAM(S): 100 INJECTION, SOLUTION INTRAVENOUS at 16:10

## 2023-01-14 RX ADMIN — Medication 30 MILLIGRAM(S): at 18:22

## 2023-01-14 RX ADMIN — Medication 1000 MILLIUNIT(S)/MIN: at 20:16

## 2023-01-14 RX ADMIN — Medication 400 MILLIGRAM(S): at 19:40

## 2023-01-14 RX ADMIN — Medication 30 MILLILITER(S): at 15:30

## 2023-01-14 NOTE — OB PROVIDER H&P - NS_EDDCALCULATED_OBGYN_ALL_OB
This is a 61 year old female here in follow-up, describing persistent symptoms of nighttime acid dyspepsia, regurgitation and throat discomfort despite omeprazole. 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 The lap band device was placed in 2008 and is not quite fully inflated to its maximum by the patient's recollection. She denies vomiting, blood in stool, black stool but has had occasional hesitancy swallowing.    Current Outpatient Prescriptions   Medication Sig   • omeprazole (PRILOSEC) 40 MG capsule Take 1 capsule by mouth daily.   • levothyroxine (SYNTHROID, LEVOTHROID) 88 MCG tablet TAKE 1 TABLET BY MOUTH DAILY. RECHECK TSH IS 6 WEEKS.   • fluticasone-salmeterol (ADVAIR DISKUS) 250-50 MCG/DOSE AEROSOL POWDER, BREATH ACTIVATED Inhale 1 puff into the lungs 2 times daily.   • albuterol (PROVENTIL HFA) 108 (90 BASE) MCG/ACT inhaler Inhale 2 puffs into the lungs every 4 hours as needed for Shortness of Breath or Wheezing.   • triamterene-hydrochlorothiazide (MAXZIDE) 75-50 MG per tablet Take 1 tablet by mouth daily. (Patient taking differently: Take 1 tablet by mouth as needed. Indications: Edema, lower leg edema )   • estrogens, conjugated (PREMARIN) cream Every third night (Patient taking differently: Place  vaginally. Every third night)   • albuterol (VENTOLIN) (2.5 MG/3ML) 0.083% nebulizer solution Take 3 mLs by nebulization every 6 hours as needed for Wheezing or Shortness of Breath.   • diclofenac (VOLTAREN) 1 % gel Apply 4 gr to joint 4 times daily Max 32 gr/day (Patient taking differently: 4 times daily as needed. Apply 4 gr to joint 4 times daily Max 32 gr/day)     No current facility-administered medications for this visit.        Allergies as of 01/23/2017 - Misha as Reviewed 01/23/2017   Allergen Reaction Noted   • Allergy Other (See Comments) 09/21/2016   • Bactrim GI UPSET and Nausea & Vomiting  10/03/2014   • Morphine Other (See Comments) 10/19/2012   • Penicillin g HIVES 04/09/2013   • Percocet [oxycodone-acetaminophen] Other (See Comments) 10/19/2012   • Percodan [oxycodone-aspirin] Other (See Comments) 10/19/2012   • Tincture of benzoin [benzoin compound] Other (See Comments) 04/09/2013   • Vicodin [hydrocodone-acetaminophen] Other (See Comments) 04/09/2013       History   Smoking Status   • Former Smoker   • Quit date: 1/1/1976   Smokeless Tobacco   • Never Used       OBJECTIVE:  Visit Vitals   • /80   • Pulse 64   • Ht 5' 6.5\" (1.689 m)   • Wt 92.5 kg   • LMP 12/18/2005   • BMI 32.43 kg/m2     Alert, appropriate, oriented, ambulatory, cooperative and not in acute distress.    ASSESSMENT:  GE reflux, history of bariatric surgery and ongoing symptoms.    PLAN:  Stop omeprazole and switch to pantoprazole 40 mg daily. Diet and lifestyle guidelines as for acid reflux disease and bariatric surgery are reviewed. I have asked her to call us with an update in a couple of weeks. If symptoms persist by then, reassessment of the device by bariatric surgery clinic may prove to be helpful.   First Trimester Sonogram

## 2023-01-14 NOTE — OB PROVIDER LABOR PROGRESS NOTE - ASSESSMENT
A/P: 31yo  @ 38w5d IOL @ 38w5d for cat II tracing    - LAISHA 12  - cat I tracing, tony regularly  - 3/50/-3 @ 0400 > /-3 @ 1000  - will initiate pitocin to increase contractions to q2-3min and reexamine in 2h  - will consider AROM on next exam
-VSS  -Patient was repositioned  -O2 was administered  -Decel resolved  -Will continue to monitor patient

## 2023-01-14 NOTE — OB RN PATIENT PROFILE - FALL HARM RISK - PATIENT NEEDS ASSISTANCE
Continue current medications. No changes.    If you have any further questions or concerns, please contact our office. [phone]
No assistance needed

## 2023-01-14 NOTE — OB NEONATOLOGY/PEDIATRICIAN DELIVERY SUMMARY - NSPEDSNEONOTESA_OBGYN_ALL_OB_FT
Called at 3.5 minutes of life by nurses to assess this baby for poor tone. On arrival baby was vigorous with normal tone but not moving left arm. I examined the arm and felt no obvious fracture and as I was there baby started to move the arm. Leo symmetric. If continues to have decreased arm movement on left would get LUE xray.

## 2023-01-14 NOTE — OB PROVIDER H&P - NSLOWPPHRISK_OBGYN_A_OB
No previous uterine incision/[place] Pregnancy/Less than or equal to 4 previous vaginal births/No known bleeding disorder/No history of postpartum hemorrhage/No other PPH risks indicated

## 2023-01-14 NOTE — OB PROVIDER H&P - ATTENDING COMMENTS
32y  at 38w5d GA by first trimester sono who presents to L&D for leakage of fluids. Patient says she felt a trickle of fluid around 5:00PM. While in triage for work-up was noted to have a 3 min decel down to 60s.  VSS  FHT - 3 min fetal decel noted  SVE 3/50/-3 no pooling  33 y/o  @ 38+5 here to r/o rupture however noted to be in early labor with prolonged decel at term   - plan to admit  - consent obtained  - expectant management  - will re-eval for augmentation  - Dr Naik aware    Claudia Ballesteros MD

## 2023-01-14 NOTE — OB RN INTRAOPERATIVE NOTE - NSSELHIDDEN_OBGYN_ALL_OB_FT
[NS_DeliveryAttending1_OBGYN_ALL_OB_FT:[id number]],[NS_DeliveryRN_OBGYN_ALL_OB_FT:[id number]],[NS_DeliveryAssist1_OBGYN_ALL_OB_FT:[id number]=]

## 2023-01-14 NOTE — OB RN DELIVERY SUMMARY - NS_SEPSISRSKCALC_OBGYN_ALL_OB_FT
EOS calculated successfully. EOS Risk Factor: 0.5/1000 live births ([facility] national incidence); GA=38w5d; Temp=98.6; ROM=0.017; GBS='Negative'; Antibiotics='No antibiotics or any antibiotics < 2 hrs prior to birth'

## 2023-01-14 NOTE — OB RN DELIVERY SUMMARY - NSDELIVERYTYPEA_OBGYN_ALL_OB
{PROVIDER CHARTING PREFERENCE:649695}    Subjective   Eliza is a 82 year old who presents for the following health issues {ACCOMPANIED BY STATEMENT (Optional):027207}    HPI     {SUPERLIST (Optional):536777}  {additonal problems for provider to add (Optional):247880}    Review of Systems   {ROS COMP (Optional):188882}      Objective    /70 (BP Location: Right arm, Patient Position: Sitting, Cuff Size: Adult Regular)   Pulse 78   Temp 97.3  F (36.3  C) (Temporal)   Resp 16   Wt 60.3 kg (133 lb)   SpO2 90%   BMI 22.83 kg/m    Body mass index is 22.83 kg/m .  Physical Exam   {Exam List (Optional):149511}    {Diagnostic Test Results (Optional):713082}    {AMBULATORY ATTESTATION (Optional):513733}          Delivery

## 2023-01-14 NOTE — OB PROVIDER H&P - NS_GESTAGE_OBGYN_ALL_OB_FT
38w4d no vertigo/no focal seizures/no difficulty walking/no generalized seizures/no tremors/no loss of sensation/no weakness/no syncope/no facial palsy

## 2023-01-14 NOTE — CHART NOTE - NSCHARTNOTEFT_GEN_A_CORE
Examined for progress  Patient with recurrent decels, otherwise category I tracing in between  Exam 1-2/50/-4  Unable to start pitocin  Discussed status with patient  Will plan for primary c/s with BTL for Recurrent decels remote from delivery.  questions answered

## 2023-01-14 NOTE — OB PROVIDER DELIVERY SUMMARY - NSPROVIDERDELIVERYNOTE_OBGYN_ALL_OB_FT
Patient was taken to the OR, a primary low transverse  section was performed and a viable male infant was delivered atraumatically in LOT presentation at 1630, no nuchal cord. Placenta delivered at 1631. The hysterotomy was reapproximated with 1-vicryl in a continuous locked fashion, rectus muscles, fascia, subcutaneous and skin were reapproximated with suture____. Excellent hemostasis was obtained at each layer of closure.  Apgars___.  EBL ___. Patient was taken to the OR, a primary low transverse  section was performed and a viable male infant was delivered atraumatically in LOT presentation at 1630, no nuchal cord. Placenta delivered at 1631. The hysterotomy was reapproximated with 0 vicryl in a continuous locked fashion. A second suture was used for additional hemostasis. The bilateral fallopian tubes were resected with the ligasure. The rectus muscles were reapproximated with 1-monocryl with a mattress suture. The fascia was reapproximated with 1-vicryl in a continuous fashion. The subcutaneous tissue was hemostatic. The skin were reapproximated with ensorb. Excellent hemostasis was obtained at each layer of closure.  Apgars 8,9.  IVF 1L  UOP 100cc

## 2023-01-14 NOTE — OB RN DELIVERY SUMMARY - NSSELHIDDEN_OBGYN_ALL_OB_FT
[NS_DeliveryAttending1_OBGYN_ALL_OB_FT:[id number]],[NS_DeliveryAssist1_OBGYN_ALL_OB_FT:[id number]],[NS_DeliveryRN_OBGYN_ALL_OB_FT:[id number]=]

## 2023-01-14 NOTE — OB PROVIDER DELIVERY SUMMARY - NSVAGINALEXAMCERT_OBGYN_ALL_OB
Duration Of Freeze Thaw-Cycle (Seconds): 0 Post-Care Instructions: I reviewed with the patient in detail post-care instructions. Patient is to wear sunprotection, and avoid picking at any of the treated lesions. Pt may apply Vaseline to crusted or scabbing areas. Detail Level: Simple Number Of Freeze-Thaw Cycles: 2 freeze-thaw cycles Render Post-Care Instructions In Note?: no Consent: The patient's consent was obtained including but not limited to risks of crusting, scabbing, blistering, scarring, darker or lighter pigmentary change, recurrence, incomplete removal and infection. Detail Level: Detailed Medical Necessity Information: It is in your best interest to select a reason for this procedure from the list below. All of these items fulfill various CMS LCD requirements except the new and changing color options. Medical Necessity Clause: This procedure was medically necessary because the lesions that were treated were: The Delivery OB Provider certifies that vaginal examination and/or abdominal examination after the delivery was done and no foreign body was found.

## 2023-01-14 NOTE — OB PROVIDER LABOR PROGRESS NOTE - NS_OBIHIFHRDETAILS_OBGYN_ALL_OB_FT
baseline FHR 120s, moderate variability, +accels, 3minutes decel to 80s
130bpm, mod variability, +accels, -decels

## 2023-01-14 NOTE — OB PROVIDER H&P - ASSESSMENT
32y  at 38w5d GA by first trimester sono who presents to L&D for leakage of fluids. Nitrzine and amnisure negative. Patient noted to have a 3minute decel down to 60s. Will admit for IOL due to NRFHT.    A/P:   -Admit to L&D  -Consent  -Admission labs  -Fetus: Cat I tracing. Continuous toco and fetal monitoring.   -GBS: Negative, no GBS ppx required   -Analgesia: epidural when requested    Discussed with Dr. Naik

## 2023-01-14 NOTE — OB PROVIDER H&P - HISTORY OF PRESENT ILLNESS
32y  at 38w5d GA by first trimester sono who presents to L&D for leakage of fluids. Patient says she felt a trickle of fluid around 5:00PM. Denies any leakage of fluids since. Reports some irregular contractions today. Patient denies vaginal bleeding. She endorses good fetal movement. Denies fevers, chills, nausea, vomiting, chest pain, SOB, dizziness and headache. No other complaints at this time.     ANAND: 2023  LMP: 2022    Prenatal course uncomplicated.      POB:  G1: FT  (), 7lbs  G2: FT  (), 8lbs  G3: PT  (), 5lbs  PGYN: -fibroids, -ovarian cysts, denies STD hx, denies abnormal PAPs   PMH: asthma  PSH: Denies  SH: Denies EtOH, tobacco and illicit drug use during this pregnancy; feels safe at home   Meds: PNVs, albuterol  Allergies: NKDA    BMI: 39  Sono: vertex, anterior placenta  EFW: 3593h ()    T(C): 36.9 (23 @ 02:22), Max: 36.9 (23 @ 23:23)  HR: 88 (23 @ 03:05) (88 - 91)  BP: 108/66 (23 @ 03:05) (108/66 - 137/75)  RR: 22 (23 @ 02:22) (22 - 22)  SpO2: --    Gen: NAD, well-appearing, AAOx3   Abd: Soft, gravid  Ext: non-tender, non-edematous  SSE: no gross pooling,  SVE: 5/50/-3  Bedside sono:  FHT:  [place]:       A/P:   -Admit to L&D  -Consent  -Admission labs  -NPO, except ice chips   -IV fluids  -Labor: Intact/*ROM. Latent/Active labor. Rj *.   -Fetus: Cat I tracing. Continuous toco and fetal monitoring.   -GBS: Negative, no GBS ppx required   -Analgesia:     Discussed with Dr. Warner 32y  at 38w5d GA by first trimester sono who presents to L&D for leakage of fluids. Patient says she felt a trickle of fluid around 5:00PM. Denies any leakage of fluids since. Reports some irregular contractions today. Patient denies vaginal bleeding. She endorses good fetal movement. Denies fevers, chills, nausea, vomiting, chest pain, SOB, dizziness and headache. No other complaints at this time.     ANAND: 2023  LMP: 2022    Prenatal course uncomplicated.      POB:  G1: FT  (), 7lbs  G2: FT  (), 8lbs  G3: PT  (), 5lbs  PGYN: -fibroids, -ovarian cysts, denies STD hx, denies abnormal PAPs   PMH: asthma  PSH: Denies  SH: Denies EtOH, tobacco and illicit drug use during this pregnancy; feels safe at home   Meds: PNVs, albuterol  Allergies: NKDA    BMI: 39  Sono: vertex, anterior placenta  EFW: 3593g ()

## 2023-01-14 NOTE — OB PROVIDER DELIVERY SUMMARY - NSSELHIDDEN_OBGYN_ALL_OB_FT
[NS_DeliveryAttending1_OBGYN_ALL_OB_FT:[id number]],[NS_DeliveryRN_OBGYN_ALL_OB_FT:[id number]],[NS_DeliveryAssist1_OBGYN_ALL_OB_FT:[id number]=]

## 2023-01-14 NOTE — OB PROVIDER H&P - NSHPPHYSICALEXAM_GEN_ALL_CORE
T(C): 36.9 (01-14-23 @ 02:22), Max: 36.9 (01-13-23 @ 23:23)  HR: 88 (01-14-23 @ 03:05) (88 - 91)  BP: 108/66 (01-14-23 @ 03:05) (108/66 - 137/75)  RR: 22 (01-14-23 @ 02:22) (22 - 22)    Gen: NAD, well-appearing, AAOx3   Abd: Soft, gravid  Ext: non-tender, non-edematous  SSE: no gross pooling, cervix dilated, no blood noted    SVE: 5/50/-3  Bedside sono: anterior placenta, vertex  FHT: baseline FHR 120s, moderate variability, +accels, 3minute decel to 60s  [place]: irregular contractions T(C): 36.9 (01-14-23 @ 02:22), Max: 36.9 (01-13-23 @ 23:23)  HR: 88 (01-14-23 @ 03:05) (88 - 91)  BP: 108/66 (01-14-23 @ 03:05) (108/66 - 137/75)  RR: 22 (01-14-23 @ 02:22) (22 - 22)    Gen: NAD, well-appearing, AAOx3   Abd: Soft, gravid  Ext: non-tender, non-edematous  SSE: no gross pooling, cervix dilated, no blood noted    SVE: 3/50/-3  Bedside sono: anterior placenta, vertex  FHT: baseline FHR 120s, moderate variability, +accels, 3minute decel to 60s  [place]: irregular contractions

## 2023-01-15 LAB
BASOPHILS # BLD AUTO: 0.03 K/UL — SIGNIFICANT CHANGE UP (ref 0–0.2)
BASOPHILS NFR BLD AUTO: 0.2 % — SIGNIFICANT CHANGE UP (ref 0–2)
EOSINOPHIL # BLD AUTO: 0.03 K/UL — SIGNIFICANT CHANGE UP (ref 0–0.5)
EOSINOPHIL NFR BLD AUTO: 0.2 % — SIGNIFICANT CHANGE UP (ref 0–6)
HCT VFR BLD CALC: 30.3 % — LOW (ref 34.5–45)
HGB BLD-MCNC: 9.7 G/DL — LOW (ref 11.5–15.5)
IMM GRANULOCYTES NFR BLD AUTO: 0.3 % — SIGNIFICANT CHANGE UP (ref 0–0.9)
LYMPHOCYTES # BLD AUTO: 17.8 % — SIGNIFICANT CHANGE UP (ref 13–44)
LYMPHOCYTES # BLD AUTO: 2.23 K/UL — SIGNIFICANT CHANGE UP (ref 1–3.3)
MCHC RBC-ENTMCNC: 26.8 PG — LOW (ref 27–34)
MCHC RBC-ENTMCNC: 32 GM/DL — SIGNIFICANT CHANGE UP (ref 32–36)
MCV RBC AUTO: 83.7 FL — SIGNIFICANT CHANGE UP (ref 80–100)
MONOCYTES # BLD AUTO: 0.63 K/UL — SIGNIFICANT CHANGE UP (ref 0–0.9)
MONOCYTES NFR BLD AUTO: 5 % — SIGNIFICANT CHANGE UP (ref 2–14)
NEUTROPHILS # BLD AUTO: 9.56 K/UL — HIGH (ref 1.8–7.4)
NEUTROPHILS NFR BLD AUTO: 76.5 % — SIGNIFICANT CHANGE UP (ref 43–77)
PLATELET # BLD AUTO: 210 K/UL — SIGNIFICANT CHANGE UP (ref 150–400)
RBC # BLD: 3.62 M/UL — LOW (ref 3.8–5.2)
RBC # FLD: 13 % — SIGNIFICANT CHANGE UP (ref 10.3–14.5)
WBC # BLD: 12.52 K/UL — HIGH (ref 3.8–10.5)
WBC # FLD AUTO: 12.52 K/UL — HIGH (ref 3.8–10.5)

## 2023-01-15 RX ORDER — INFLUENZA VIRUS VACCINE 15; 15; 15; 15 UG/.5ML; UG/.5ML; UG/.5ML; UG/.5ML
0.5 SUSPENSION INTRAMUSCULAR ONCE
Refills: 0 | Status: COMPLETED | OUTPATIENT
Start: 2023-01-15 | End: 2023-01-16

## 2023-01-15 RX ORDER — IBUPROFEN 200 MG
600 TABLET ORAL EVERY 6 HOURS
Refills: 0 | Status: DISCONTINUED | OUTPATIENT
Start: 2023-01-15 | End: 2023-01-16

## 2023-01-15 RX ADMIN — Medication 30 MILLIGRAM(S): at 12:53

## 2023-01-15 RX ADMIN — Medication 30 MILLIGRAM(S): at 00:40

## 2023-01-15 RX ADMIN — Medication 975 MILLIGRAM(S): at 21:13

## 2023-01-15 RX ADMIN — Medication 975 MILLIGRAM(S): at 08:31

## 2023-01-15 RX ADMIN — Medication 975 MILLIGRAM(S): at 14:49

## 2023-01-15 RX ADMIN — ENOXAPARIN SODIUM 60 MILLIGRAM(S): 100 INJECTION SUBCUTANEOUS at 05:56

## 2023-01-15 RX ADMIN — Medication 600 MILLIGRAM(S): at 23:43

## 2023-01-15 RX ADMIN — Medication 975 MILLIGRAM(S): at 03:20

## 2023-01-15 RX ADMIN — Medication 30 MILLIGRAM(S): at 05:56

## 2023-01-15 RX ADMIN — Medication 600 MILLIGRAM(S): at 18:16

## 2023-01-15 NOTE — DISCHARGE NOTE OB - PATIENT PORTAL LINK FT
You can access the FollowMyHealth Patient Portal offered by [facility] by registering at the following website: [email or web address]. By joining [facility]’s FollowMyHealth portal, you will also be able to view your health information using other applications (apps) compatible with our system.

## 2023-01-15 NOTE — DISCHARGE NOTE OB - CARE PROVIDER_API CALL
Liudmila Naik)  Obstetrics and Gynecology  [address] [place] [address]  Phone: [phone]  Fax: [phone]  Established Patient  Follow Up Time: 1 week

## 2023-01-15 NOTE — DISCHARGE NOTE OB - CARE PLAN
Principal Discharge DX:	 delivery delivered  Assessment and plan of treatment:	Please call your provider in 1 week for wound check. Take medications as directed, regular diet, activity as tolerated. Exclusive breast feeding for the first 6 months is recommended. Nothing per vagina for 6 weeks (incl. sex, douching, etc). If you have additional concerns, please inform your provider.  Secondary Diagnosis:	Encounter for sterilization   1

## 2023-01-15 NOTE — DISCHARGE NOTE OB - MATERIALS PROVIDED
Vaccinations/[facility] [place] Screening Program/[place]  Immunization Record/Breastfeeding Log/Breastfeeding Mother’s Support Group Information/Guide to Postpartum Care/[facility] Hearing Screen Program/Back To Sleep Handout/Shaken Baby Prevention Handout/Breastfeeding Guide and Packet/Birth Certificate Instructions/Discharge Medication Information for Patients and Families Pocket Guide/MMR Vaccination (VIS Pub Date: 2012)/Tdap Vaccination (VIS Pub Date: 2012)

## 2023-01-15 NOTE — PROGRESS NOTE ADULT - ASSESSMENT
A/P: ANA LUISA HARTMANN is a 32y   s/p rCS + BS @38w5d GA 2/2 cat 2 tracing remote from delivery  POD1    # lightheadedness  - Hb 11.9 > pending  - BPs 100-113/55-70 O/N  - Will monitor    #Routine postpartum care  - Stable, doing well postpartum  - Pain: well controlled, c/w current regimen  - GI: c/w regular diet, pending flatus  - : pending TOV - for bladder scan if cannot void 6h post carlton removal, lochia appropriate  - DVT ppx: SCDs, ambulation encouraged, lovenox   - Healthy baby boy, declines circ  - Dispo: will reassess in am

## 2023-01-15 NOTE — PROGRESS NOTE ADULT - SUBJECTIVE AND OBJECTIVE BOX
INTERVAL HPI/OVERNIGHT EVENTS:  32y Female s/p c section under spinal anesthesia with duramorph for post op analgesia on 01/14/2023    Vital Signs Last 24 Hrs  T(C): 36.8 (15 Ant 2023 11:46), Max: 37 (14 Jan 2023 20:25)  T(F): 98.3 (15 Ant 2023 11:46), Max: 98.6 (14 Jan 2023 20:25)  HR: 82 (15 Ant 2023 11:46) (76 - 91)  BP: 109/68 (15 Ant 2023 11:46) (100/55 - 128/71)  BP(mean): --  RR: 16 (15 Ant 2023 11:46) (16 - 22)  SpO2: 97% (15 Ant 2023 11:46) (94% - 99%)    Parameters below as of 15 Ant 2023 11:46  Patient On (Oxygen Delivery Method): room air            Patient satisfied, no complaints    Patients pain is well controlled.    No respiratory events overnight    No pruritis at this time    Patient seen and doing well     No headache      No residual numbness or weakness, sensory and motor function intact    Site not examined...no complaints    No anesthetic complications or complaints noted or reported          .

## 2023-01-15 NOTE — PROGRESS NOTE ADULT - SUBJECTIVE AND OBJECTIVE BOX
ANA LUISA HARTMANN is a 32y   s/p rCS + BS @38w5d GA 2/ cat 2 tracing remote from delivery  POD1    SUBJECTIVE:  No acute events overnight.  Patient reports lightheadedness and inability to void post carlton removal.  Pain is well controlled.  -flatus, - voiding, -BM.  Ambulating and tolerating PO.  Appropriate lochia.  Denies fever, chills, nausea, and vomiting.  She denies HA, blurry vision, palpitations, chest pain and SOB.     OBJECTIVE:  Physical exam:  General: AOx3, NAD.  Abdomen: Soft, appropriately tender to palpitation, fundus firm.  Incision: Dressing removed revealing clean, dry and intact incision  Vaginal: expectant lochia  Ext: No DVT signs, warm extremities.    Vital Signs Last 24 Hrs  T(C): 36.4 (15 Ant 2023 05:21), Max: 37.0 (2023 12:08)  T(F): 97.5 (15 Ant 2023 05:21), Max: 98.6 (2023 12:08)  HR: 80 (15 Ant 2023 05:21) (76 - 92)  BP: 113/69 (15 Ant 2023 05:21) (100/55 - 128/71)  RR: 16 (15 Ant 2023 05:21) (16 - 24)  SpO2: 97% (15 Ant 2023 05:21) (94% - 99%)    LABS:                        11.9   9.49  )-----------( 251      ( 2023 01:00 )             35.6

## 2023-01-15 NOTE — DISCHARGE NOTE OB - HOSPITAL COURSE
She is now a  who had a primary  section and bilateral salpingectomy y at 38w4d due to persistent category II tracing remote from delivery. She had an uncomplicated surgery and postpartum course. She was discharged home in stable condition.

## 2023-01-15 NOTE — DISCHARGE NOTE OB - NS MD DC FALL RISK RISK
For information on Fall & Injury Prevention, visit: [email or web address] OR  [email or web address] OR  https://www.cdc.gov/steadi/patient.html

## 2023-01-15 NOTE — DISCHARGE NOTE OB - MEDICATION SUMMARY - MEDICATIONS TO TAKE
I will START or STAY ON the medications listed below when I get home from the hospital:    acetaminophen 325 mg oral tablet  -- 3 tab(s) by mouth every 6 hours   -- This product contains acetaminophen.  Do not use  with any other product containing acetaminophen to prevent possible liver damage.    -- Indication: For Pain    ibuprofen 600 mg oral tablet  -- 1 tab(s) by mouth every 6 hours   -- Do not take this drug if you are pregnant.  It is very important that you take or use this exactly as directed.  Do not skip doses or discontinue unless directed by your doctor.  May cause drowsiness or dizziness.  Obtain medical advice before taking any non-prescription drugs as some may affect the action of this medication.  Take with food or milk.    -- Indication: For Pain    oxyCODONE 5 mg oral tablet  -- 1 tab(s) by mouth every 6 hours, As Needed -for severe pain MDD:4  -- Caution federal law prohibits the transfer of this drug to any person other  than the person for whom it was prescribed.  It is very important that you take or use this exactly as directed.  Do not skip doses or discontinue unless directed by your doctor.  May cause drowsiness or dizziness.  This prescription cannot be refilled.  Using more of this medication than prescribed may cause serious breathing problems.    -- Indication: For severe pain    ferrous sulfate 325 mg (65 mg elemental iron) oral tablet  -- 1 tab(s) by mouth once a day   -- Check with your doctor before becoming pregnant.  Do not chew, break, or crush.  May discolor urine or feces.    -- Indication: For Nutrition    Vitamin C 250 mg oral tablet  -- 1 tab(s) by mouth once a day   -- Indication: For Nutrition

## 2023-01-16 ENCOUNTER — APPOINTMENT (OUTPATIENT)
Dept: ANTEPARTUM | Facility: CLINIC | Age: 33
End: 2023-01-16

## 2023-01-16 VITALS
SYSTOLIC BLOOD PRESSURE: 128 MMHG | RESPIRATION RATE: 18 BRPM | OXYGEN SATURATION: 97 % | TEMPERATURE: 98 F | HEART RATE: 94 BPM | DIASTOLIC BLOOD PRESSURE: 75 MMHG

## 2023-01-16 PROCEDURE — 86769 SARS-COV-2 COVID-19 ANTIBODY: CPT

## 2023-01-16 PROCEDURE — U0005: CPT

## 2023-01-16 PROCEDURE — 88302 TISSUE EXAM BY PATHOLOGIST: CPT

## 2023-01-16 PROCEDURE — 90686 IIV4 VACC NO PRSV 0.5 ML IM: CPT

## 2023-01-16 PROCEDURE — 86901 BLOOD TYPING SEROLOGIC RH(D): CPT

## 2023-01-16 PROCEDURE — 36415 COLL VENOUS BLD VENIPUNCTURE: CPT

## 2023-01-16 PROCEDURE — 86850 RBC ANTIBODY SCREEN: CPT

## 2023-01-16 PROCEDURE — U0003: CPT

## 2023-01-16 PROCEDURE — 86900 BLOOD TYPING SEROLOGIC ABO: CPT

## 2023-01-16 PROCEDURE — 85025 COMPLETE CBC W/AUTO DIFF WBC: CPT

## 2023-01-16 PROCEDURE — 86780 TREPONEMA PALLIDUM: CPT

## 2023-01-16 PROCEDURE — 59050 FETAL MONITOR W/REPORT: CPT

## 2023-01-16 RX ORDER — ACETAMINOPHEN 500 MG
3 TABLET ORAL
Qty: 168 | Refills: 0
Start: 2023-01-16 | End: 2023-01-29

## 2023-01-16 RX ORDER — FERROUS SULFATE 325(65) MG
1 TABLET ORAL
Qty: 30 | Refills: 0
Start: 2023-01-16 | End: 2023-02-14

## 2023-01-16 RX ORDER — ASCORBIC ACID 60 MG
1 TABLET,CHEWABLE ORAL
Qty: 30 | Refills: 0
Start: 2023-01-16 | End: 2023-02-14

## 2023-01-16 RX ORDER — IBUPROFEN 200 MG
1 TABLET ORAL
Qty: 56 | Refills: 0
Start: 2023-01-16 | End: 2023-01-29

## 2023-01-16 RX ORDER — OXYCODONE HYDROCHLORIDE 5 MG/1
1 TABLET ORAL
Qty: 8 | Refills: 0
Start: 2023-01-16 | End: 2023-01-17

## 2023-01-16 RX ADMIN — INFLUENZA VIRUS VACCINE 0.5 MILLILITER(S): 15; 15; 15; 15 SUSPENSION INTRAMUSCULAR at 05:25

## 2023-01-16 RX ADMIN — Medication 600 MILLIGRAM(S): at 05:25

## 2023-01-16 RX ADMIN — Medication 600 MILLIGRAM(S): at 13:05

## 2023-01-16 RX ADMIN — Medication 975 MILLIGRAM(S): at 03:18

## 2023-01-16 RX ADMIN — ENOXAPARIN SODIUM 60 MILLIGRAM(S): 100 INJECTION SUBCUTANEOUS at 05:25

## 2023-01-16 RX ADMIN — MAGNESIUM HYDROXIDE 30 MILLILITER(S): 400 TABLET, CHEWABLE ORAL at 08:46

## 2023-01-16 RX ADMIN — Medication 975 MILLIGRAM(S): at 09:42

## 2023-01-16 RX ADMIN — SIMETHICONE 80 MILLIGRAM(S): 80 TABLET, CHEWABLE ORAL at 05:25

## 2023-01-16 RX ADMIN — Medication 975 MILLIGRAM(S): at 08:48

## 2023-01-16 RX ADMIN — Medication 600 MILLIGRAM(S): at 13:25

## 2023-01-16 NOTE — PROGRESS NOTE ADULT - ASSESSMENT
A/P: ANA LUISA HARTMANN is a 32y   s/p rCS + BS @38w5d GA 2/2 cat 2 tracing remote from delivery  POD1    # lightheadedness - resolved  - Hb 11.9 > 9.7  - BPs wnl  - Will monitor    #Routine postpartum care  - Stable, doing well postpartum  - Pain: well controlled, c/w current regimen  - GI: c/w regular diet, pending flatus  - : voiding, lochia appropriate  - DVT ppx: SCDs, ambulation encouraged, lovenox   - Healthy baby boy, declines circ  - Dispo: will reassess in am A/P: ANA LUISA HARTMANN is a 32y   s/p rCS + BS @38w5d GA 2/2 cat 2 tracing remote from delivery  POD2    # lightheadedness - resolved  - Hb 11.9 > 9.7  - BPs wnl  - Will monitor    #Routine postpartum care  - Stable, doing well postpartum  - Pain: well controlled, c/w current regimen  - GI: c/w regular diet, pending flatus  - : voiding, lochia appropriate  - DVT ppx: SCDs, ambulation encouraged, lovenox   - Healthy baby boy, declines circ  - Dispo: will reassess in am

## 2023-01-16 NOTE — PROGRESS NOTE ADULT - SUBJECTIVE AND OBJECTIVE BOX
ANA LUISA HARTMANN is a 32y   s/p rCS + BS @38w5d GA 2/ cat 2 tracing remote from delivery  POD1    SUBJECTIVE:  No acute events overnight.  Patient reports lightheadedness and inability to void post carlton removal.  Pain is well controlled.  +flatus, + voiding, -BM.  Ambulating and tolerating PO.  Appropriate lochia.  Denies fever, chills, nausea, and vomiting.  She denies HA, blurry vision, palpitations, chest pain and SOB.     OBJECTIVE:  Physical exam:  General: AOx3, NAD.  Abdomen: Soft, appropriately tender to palpitation, fundus firm.  Incision: Dressing removed revealing clean, dry and intact incision  Vaginal: expectant lochia  Ext: No DVT signs, warm extremities.    ICU Vital Signs Last 24 Hrs  T(C): 36.9 (15 Ant 2023 15:10), Max: 36.9 (15 Ant 2023 15:10)  T(F): 98.4 (15 Ant 2023 15:10), Max: 98.4 (15 Ant 2023 15:10)  HR: 84 (15 Ant 2023 15:10) (80 - 85)  BP: 108/67 (15 Ant 2023 15:10) (105/69 - 113/69)  RR: 18 (15 Ant 2023 15:10) (16 - 18)  SpO2: 98% (15 Ant 2023 15:10) (97% - 98%)      LABS:                        11.9   9.49  )-----------( 251      ( 2023 01:00 )             35.6        ANA LUISA HARTMANN is a 32y   s/p rCS + BS @38w5d GA 2/ cat 2 tracing remote from delivery  POD2    SUBJECTIVE:  No acute events overnight.  Patient reports lightheadedness and inability to void post carlton removal.  Pain is well controlled.  +flatus, + voiding, -BM.  Ambulating and tolerating PO.  Appropriate lochia.  Denies fever, chills, nausea, and vomiting.  She denies HA, blurry vision, palpitations, chest pain and SOB.     OBJECTIVE:  Physical exam:  General: AOx3, NAD.  Abdomen: Soft, appropriately tender to palpitation, fundus firm.  Incision: Dressing removed revealing clean, dry and intact incision  Vaginal: expectant lochia  Ext: No DVT signs, warm extremities.    ICU Vital Signs Last 24 Hrs  T(C): 36.9 (15 Ant 2023 15:10), Max: 36.9 (15 Ant 2023 15:10)  T(F): 98.4 (15 Ant 2023 15:10), Max: 98.4 (15 Ant 2023 15:10)  HR: 84 (15 Ant 2023 15:10) (80 - 85)  BP: 108/67 (15 Ant 2023 15:10) (105/69 - 113/69)  RR: 18 (15 Ant 2023 15:10) (16 - 18)  SpO2: 98% (15 Ant 2023 15:10) (97% - 98%)      LABS:                        11.9   9.49  )-----------( 251      ( 2023 01:00 )             35.6

## 2023-01-16 NOTE — PROGRESS NOTE ADULT - ATTENDING COMMENTS
Agree with note  No complaints  Continue routine PO care.
32y   s/p rCS + BS @38w5d GA 2/ cat 2 tracing remote from delivery  - post partum Hgb 9.7, consistent with acute blood loss anemia, minimal bleeding/lochia now, with symptoms of anemia immediately post partum, now resolved, feels well   - healthy male infant at bedside, declines circumcision for infant   - vital signs, lab results, and physical exam reassuring otherwise   - desires discharge home today, cleared for discharge

## 2023-01-17 ENCOUNTER — NON-APPOINTMENT (OUTPATIENT)
Age: 33
End: 2023-01-17

## 2023-01-17 ENCOUNTER — APPOINTMENT (OUTPATIENT)
Dept: OBGYN | Facility: CLINIC | Age: 33
End: 2023-01-17

## 2023-01-18 PROBLEM — J45.909 UNSPECIFIED ASTHMA, UNCOMPLICATED: Chronic | Status: ACTIVE | Noted: 2023-01-13

## 2023-01-19 ENCOUNTER — APPOINTMENT (OUTPATIENT)
Dept: OBGYN | Facility: CLINIC | Age: 33
End: 2023-01-19

## 2023-01-24 ENCOUNTER — APPOINTMENT (OUTPATIENT)
Dept: OBGYN | Facility: CLINIC | Age: 33
End: 2023-01-24
Payer: MEDICAID

## 2023-01-24 DIAGNOSIS — Z98.891 HISTORY OF UTERINE SCAR FROM PREVIOUS SURGERY: ICD-10-CM

## 2023-01-24 DIAGNOSIS — Z90.79 ACQUIRED ABSENCE OF OTHER GENITAL ORGAN(S): ICD-10-CM

## 2023-01-24 DIAGNOSIS — Z09 ENCOUNTER FOR FOLLOW-UP EXAMINATION AFTER COMPLETED TREATMENT FOR CONDITIONS OTHER THAN MALIGNANT NEOPLASM: ICD-10-CM

## 2023-01-24 LAB — SURGICAL PATHOLOGY STUDY: SIGNIFICANT CHANGE UP

## 2023-01-24 PROCEDURE — 99024 POSTOP FOLLOW-UP VISIT: CPT

## 2023-01-24 PROCEDURE — 0503F POSTPARTUM CARE VISIT: CPT

## 2023-01-24 NOTE — DISCUSSION/SUMMARY
[FreeTextEntry1] : 33 y/o now  s/p pCS with BTL at 38w5d on  after failed IOL for PROM. \par - [place] screen negative, mood appropriate, feels well taking care of baby\par - discussed continuing prenatal vitamins \par - discussed continued BF for 6 months, exclusively if possible (benefits explained) \par - discussed adequate diet and physical activity\par - contraception: s/p BTL \par - last pap: none on record \par \par She verbalized understanding and agreement with above counseling regarding differential diagnosis, evaluation, and plan. \par She was given time for questions/concerns which were all answered to her apparent satisfaction.\par All designated lab work for today drawn in office. \par \par RTO 4w for final PP visit.

## 2023-01-24 NOTE — COUNSELING
[Nutrition/ Exercise/ Weight Management] : nutrition, exercise, weight management [Vitamins/Supplements] : vitamins/supplements [Contraception/ Emergency Contraception/ Safe Sexual Practices] : contraception, emergency contraception, safe sexual practices [Lab Results] : lab results

## 2023-01-24 NOTE — PHYSICAL EXAM
[Appropriately responsive] : appropriately responsive [Alert] : alert [No Acute Distress] : no acute distress [Soft] : soft [Non-tender] : non-tender [Non-distended] : non-distended [Oriented x3] : oriented x3 [FreeTextEntry7] : steri strips removed, incision healing well, firm fundus below umbilicus

## 2023-01-24 NOTE — HISTORY OF PRESENT ILLNESS
[N] : Patient is not sexually active [Y] : Positive pregnancy history [Menarche Age: ____] : age at menarche was [unfilled] [Previously active] : previously active [Men] : men [LMPDate] : 04/18/2022 [de-identified] : had a Salpingectomy  [PGHxTotal] : 4 [[facility]] : 3 [PGxPremature] : 1 [[facility] CenterxLiving] : 4 [FreeTextEntry1] : 04/18/2022

## 2023-02-21 ENCOUNTER — APPOINTMENT (OUTPATIENT)
Dept: OBGYN | Facility: CLINIC | Age: 33
End: 2023-02-21

## 2023-03-07 ENCOUNTER — APPOINTMENT (OUTPATIENT)
Dept: OBGYN | Facility: CLINIC | Age: 33
End: 2023-03-07
Payer: MEDICAID

## 2023-03-07 VITALS
HEIGHT: 65 IN | DIASTOLIC BLOOD PRESSURE: 72 MMHG | WEIGHT: 213 LBS | BODY MASS INDEX: 35.49 KG/M2 | SYSTOLIC BLOOD PRESSURE: 118 MMHG

## 2023-03-07 PROCEDURE — 0503F POSTPARTUM CARE VISIT: CPT

## 2023-03-08 RX ORDER — FERROUS SULFATE TAB 325 MG (65 MG ELEMENTAL FE) 325 (65 FE) MG
325 (65 FE) TAB ORAL
Qty: 30 | Refills: 0 | Status: ACTIVE | COMMUNITY
Start: 2023-02-17

## 2023-03-08 RX ORDER — CALCIUM CITRATE, IRON PENTACARBONYL, CHOLECALCIFEROL, .ALPHA.-TOCOPHEROL, DL-, PYRIDOXINE HYDROCHLORIDE, FOLIC ACID, DOCUSATE SODIUM, AND DOCONEXENT 104; 27; 400; 30; 25; 1; 50; 260 MG/1; MG/1; [IU]/1; [IU]/1; MG/1; MG/1; MG/1; MG/1
27-1-260 CAPSULE, GELATIN COATED ORAL
Qty: 30 | Refills: 0 | Status: DISCONTINUED | COMMUNITY
Start: 2022-11-06 | End: 2023-03-08

## 2023-03-08 RX ORDER — NITROFURANTOIN (MONOHYDRATE/MACROCRYSTALS) 25; 75 MG/1; MG/1
100 CAPSULE ORAL
Qty: 14 | Refills: 0 | Status: DISCONTINUED | COMMUNITY
Start: 2023-02-17

## 2023-03-08 RX ORDER — IBUPROFEN 600 MG/1
600 TABLET, FILM COATED ORAL
Qty: 56 | Refills: 0 | Status: DISCONTINUED | COMMUNITY
Start: 2023-01-16

## 2023-03-08 RX ORDER — MICONAZOLE NITRATE 200 MG/1
200 SUPPOSITORY VAGINAL
Qty: 3 | Refills: 0 | Status: DISCONTINUED | COMMUNITY
Start: 2022-08-26 | End: 2023-03-08

## 2023-03-08 RX ORDER — COVID-19 ANTIGEN TEST
KIT MISCELLANEOUS
Qty: 2 | Refills: 0 | Status: DISCONTINUED | COMMUNITY
Start: 2022-12-27

## 2023-03-08 RX ORDER — MULTIVIT 47/IRON/FOLATE 1/DHA 27-1-300MG
27-0.6-0.4-3 CAPSULE ORAL
Qty: 30 | Refills: 0 | Status: ACTIVE | COMMUNITY
Start: 2023-01-27

## 2023-03-08 RX ORDER — OXYCODONE 5 MG/1
5 TABLET ORAL
Qty: 8 | Refills: 0 | Status: DISCONTINUED | COMMUNITY
Start: 2023-01-16

## 2023-03-08 NOTE — HISTORY OF PRESENT ILLNESS
[LMP unknown] : LMP unknown [N] : Patient reports normal menses [Tubal Occlusion] : has had a tubal occlusion [Y] : Positive pregnancy history [unknown] : Patient is unsure of the date of her LMP [Menarche Age: ____] : age at menarche was [unfilled] [No] : Patient does not have concerns regarding sex [Currently Active] : currently active [FreeTextEntry1] : 31 y/o now  s/p pCS with BTL at 38w5d on  after failed IOL for PROM. \par \par Prenatal History: uncomplicated\par Delivery History: Uncomplicated surgical course, QBL: 381, Hgb 9.7 on discharge. \par Male infant, 8lbs 4oz, APGARS 8/9.\par \par Current Status: \par - Breast: breast and bottle feeding, reports no difficulty or issues with latching, denies any breast tenderness/discomfort/pain, denies nipple cracking/pain/bleeding/discharge. \par - Mood: denies any significant anxiety or depression related symptoms, feels well supported at home, feels confident and comfortable with infant care. \par - Lochia: minimal. Denies any dizziness, lightheadedness, feeling faint, SOB, or ROMO. \par - Menses: has not resumed yet. Denies any abnormal/irregular/heavy bleeding. \par - Sexual intercourse: has not resumed. \par - Contraception: s/p BTL \par - Last PAP: none on record \par \par Symptoms: no fever, chills, malaise, or myalgia. No issues with headaches. Reports no sleep or appetite disturbances. Denies any issues with voiding/BM.  [HIVDate] : 12/27/22 [TextBox_53] : neg [SyphilisDate] : 12/27/22 [TextBox_58] : neg  [GonorrheaDate] : 06/10/22 [TextBox_63] : neg [ChlamydiaDate] : 06/10/22 [TextBox_68] : neg  [PGHxTotal] : 4 [[facility] CenterlTerm] : 3 [PGxPremature] : 1 [[facility] CenterxLiving] : 4

## 2023-03-08 NOTE — DISCUSSION/SUMMARY
[FreeTextEntry1] : 31 y/o now  s/p pCS with BTL at 38w5d on  after failed IOL for PROM. \par - [place] screen negative, mood appropriate, feels well taking care of baby\par - discussed continuing prenatal vitamins \par - discussed continued BF for 6 months, exclusively if possible (benefits explained) \par - discussed adequate diet and physical activity\par - contraception: s/p BTL \par - last pap: none on record \par \par She verbalized understanding and agreement with above counseling regarding differential diagnosis, evaluation, and plan. \par She was given time for questions/concerns which were all answered to her apparent satisfaction.\par All designated lab work for today drawn in office. \par \par RTO 1-3 months for ANNUAL and pap screening

## 2023-03-08 NOTE — PHYSICAL EXAM
[Appropriately responsive] : appropriately responsive [Alert] : alert [No Acute Distress] : no acute distress [Soft] : soft [Non-tender] : non-tender [Non-distended] : non-distended [Oriented x3] : oriented x3 [FreeTextEntry7] : incision healed well, firm fundus well below umbilicus

## 2024-04-10 ENCOUNTER — APPOINTMENT (OUTPATIENT)
Dept: OBGYN | Facility: CLINIC | Age: 34
End: 2024-04-10